# Patient Record
Sex: FEMALE | Race: WHITE | Employment: UNEMPLOYED | URBAN - NONMETROPOLITAN AREA
[De-identification: names, ages, dates, MRNs, and addresses within clinical notes are randomized per-mention and may not be internally consistent; named-entity substitution may affect disease eponyms.]

---

## 2019-07-30 ENCOUNTER — OFFICE VISIT (OUTPATIENT)
Dept: FAMILY MEDICINE CLINIC | Age: 39
End: 2019-07-30
Payer: COMMERCIAL

## 2019-07-30 VITALS
WEIGHT: 160 LBS | OXYGEN SATURATION: 98 % | TEMPERATURE: 97.1 F | HEART RATE: 74 BPM | SYSTOLIC BLOOD PRESSURE: 112 MMHG | DIASTOLIC BLOOD PRESSURE: 70 MMHG

## 2019-07-30 DIAGNOSIS — Z76.89 ENCOUNTER TO ESTABLISH CARE: Primary | ICD-10-CM

## 2019-07-30 DIAGNOSIS — K58.2 IRRITABLE BOWEL SYNDROME WITH BOTH CONSTIPATION AND DIARRHEA: ICD-10-CM

## 2019-07-30 DIAGNOSIS — Z86.19 HISTORY OF VARICELLA INFECTION: Primary | ICD-10-CM

## 2019-07-30 PROCEDURE — G8427 DOCREV CUR MEDS BY ELIG CLIN: HCPCS | Performed by: FAMILY MEDICINE

## 2019-07-30 PROCEDURE — 1036F TOBACCO NON-USER: CPT | Performed by: FAMILY MEDICINE

## 2019-07-30 PROCEDURE — 99203 OFFICE O/P NEW LOW 30 MIN: CPT | Performed by: FAMILY MEDICINE

## 2019-07-30 PROCEDURE — G8421 BMI NOT CALCULATED: HCPCS | Performed by: FAMILY MEDICINE

## 2019-07-30 RX ORDER — BUPROPION HYDROCHLORIDE 300 MG/1
300 TABLET ORAL EVERY MORNING
COMMUNITY
End: 2019-11-08

## 2019-07-30 RX ORDER — CLONAZEPAM 0.5 MG/1
0.5 TABLET ORAL 3 TIMES DAILY PRN
COMMUNITY
End: 2020-10-08 | Stop reason: SDUPTHER

## 2019-07-30 RX ORDER — HYOSCYAMINE SULFATE 0.125 MG
125 TABLET ORAL EVERY 4 HOURS PRN
Qty: 180 TABLET | Refills: 3 | Status: SHIPPED | OUTPATIENT
Start: 2019-07-30 | End: 2021-06-14 | Stop reason: SDUPTHER

## 2019-07-30 ASSESSMENT — ENCOUNTER SYMPTOMS
ALLERGIC/IMMUNOLOGIC NEGATIVE: 1
GASTROINTESTINAL NEGATIVE: 1
EYES NEGATIVE: 1
RESPIRATORY NEGATIVE: 1

## 2019-07-30 ASSESSMENT — PATIENT HEALTH QUESTIONNAIRE - PHQ9
SUM OF ALL RESPONSES TO PHQ QUESTIONS 1-9: 0
2. FEELING DOWN, DEPRESSED OR HOPELESS: 0
SUM OF ALL RESPONSES TO PHQ9 QUESTIONS 1 & 2: 0
SUM OF ALL RESPONSES TO PHQ QUESTIONS 1-9: 0
1. LITTLE INTEREST OR PLEASURE IN DOING THINGS: 0

## 2019-07-30 NOTE — PROGRESS NOTES
and full passive range of motion without pain. Neck supple. Cardiovascular: Normal rate, regular rhythm, S1 normal, S2 normal, normal heart sounds and normal pulses. Pulmonary/Chest: Effort normal and breath sounds normal.   Abdominal: Soft. Normal appearance and bowel sounds are normal.   Musculoskeletal: Normal range of motion. Neurological: She is alert and oriented to person, place, and time. She has normal strength and normal reflexes. Skin: Skin is warm, dry and intact. Psychiatric: She has a normal mood and affect. Her speech is normal and behavior is normal. Judgment and thought content normal. Cognition and memory are normal.      /70 (Site: Right Upper Arm, Position: Sitting, Cuff Size: Medium Adult)   Pulse 74   Temp 97.1 °F (36.2 °C) (Temporal)   Wt 160 lb (72.6 kg)   SpO2 98%      ASSESSMENT:     Diagnosis Orders   1. Encounter to establish care  CBC    Comprehensive Metabolic Panel    Hemoglobin A1C    MICROALBUMIN, RANDOM URINE (W/O CREATININE)    Lipid Panel    Urinalysis   2. Irritable bowel syndrome with both constipation and diarrhea-ongoing hyoscyamine (ANASPAZ;LEVSIN) 125 MCG tablet        PLAN:    1. Records from previous PCP. Blood work. Go to health department for vaccination if needed. 2.  Refill medication.   Follow-up 6 months

## 2019-08-06 DIAGNOSIS — Z86.19 HISTORY OF VARICELLA INFECTION: ICD-10-CM

## 2019-08-06 DIAGNOSIS — Z76.89 ENCOUNTER TO ESTABLISH CARE: ICD-10-CM

## 2019-08-06 LAB
ALBUMIN SERPL-MCNC: 4.1 G/DL (ref 3.5–5.2)
ALP BLD-CCNC: 76 U/L (ref 35–104)
ALT SERPL-CCNC: 16 U/L (ref 5–33)
ANION GAP SERPL CALCULATED.3IONS-SCNC: 11 MMOL/L (ref 7–19)
AST SERPL-CCNC: 16 U/L (ref 5–32)
BILIRUB SERPL-MCNC: 0.4 MG/DL (ref 0.2–1.2)
BILIRUBIN URINE: NEGATIVE
BLOOD, URINE: NEGATIVE
BUN BLDV-MCNC: 11 MG/DL (ref 6–20)
CALCIUM SERPL-MCNC: 9.1 MG/DL (ref 8.6–10)
CHLORIDE BLD-SCNC: 104 MMOL/L (ref 98–111)
CHOLESTEROL, TOTAL: 148 MG/DL (ref 160–199)
CLARITY: CLEAR
CO2: 24 MMOL/L (ref 22–29)
COLOR: YELLOW
CREAT SERPL-MCNC: 0.7 MG/DL (ref 0.5–0.9)
GFR NON-AFRICAN AMERICAN: >60
GLUCOSE BLD-MCNC: 85 MG/DL (ref 74–109)
GLUCOSE URINE: NEGATIVE MG/DL
HBA1C MFR BLD: 5.3 % (ref 4–6)
HCT VFR BLD CALC: 39.8 % (ref 37–47)
HDLC SERPL-MCNC: 51 MG/DL (ref 65–121)
HEMOGLOBIN: 12.5 G/DL (ref 12–16)
KETONES, URINE: NEGATIVE MG/DL
LDL CHOLESTEROL CALCULATED: 82 MG/DL
LEUKOCYTE ESTERASE, URINE: NEGATIVE
MCH RBC QN AUTO: 26.7 PG (ref 27–31)
MCHC RBC AUTO-ENTMCNC: 31.4 G/DL (ref 33–37)
MCV RBC AUTO: 84.9 FL (ref 81–99)
MICROALBUMIN UR-MCNC: <1.2 MG/DL (ref 0–19)
NITRITE, URINE: NEGATIVE
PDW BLD-RTO: 14.3 % (ref 11.5–14.5)
PH UA: 6.5 (ref 5–8)
PLATELET # BLD: 344 K/UL (ref 130–400)
PMV BLD AUTO: 10.4 FL (ref 9.4–12.3)
POTASSIUM SERPL-SCNC: 4.1 MMOL/L (ref 3.5–5)
PROTEIN UA: NEGATIVE MG/DL
RBC # BLD: 4.69 M/UL (ref 4.2–5.4)
SODIUM BLD-SCNC: 139 MMOL/L (ref 136–145)
SPECIFIC GRAVITY UA: 1.01 (ref 1–1.03)
TOTAL PROTEIN: 6.7 G/DL (ref 6.6–8.7)
TRIGL SERPL-MCNC: 74 MG/DL (ref 0–149)
UROBILINOGEN, URINE: 0.2 E.U./DL
WBC # BLD: 7 K/UL (ref 4.8–10.8)

## 2019-08-09 LAB
VARICELLA ZOSTER AB IGM: 0.64 ISR
VZV IGG SER QL IA: 1758 IV

## 2019-11-08 ENCOUNTER — OFFICE VISIT (OUTPATIENT)
Dept: FAMILY MEDICINE CLINIC | Age: 39
End: 2019-11-08
Payer: COMMERCIAL

## 2019-11-08 VITALS
TEMPERATURE: 98 F | DIASTOLIC BLOOD PRESSURE: 80 MMHG | RESPIRATION RATE: 16 BRPM | WEIGHT: 172 LBS | OXYGEN SATURATION: 98 % | SYSTOLIC BLOOD PRESSURE: 124 MMHG | HEART RATE: 70 BPM

## 2019-11-08 DIAGNOSIS — H93.13 TINNITUS OF BOTH EARS: Primary | ICD-10-CM

## 2019-11-08 DIAGNOSIS — L60.0 INGROWN TOENAIL OF LEFT FOOT: ICD-10-CM

## 2019-11-08 PROCEDURE — 99213 OFFICE O/P EST LOW 20 MIN: CPT | Performed by: NURSE PRACTITIONER

## 2019-11-08 PROCEDURE — G8427 DOCREV CUR MEDS BY ELIG CLIN: HCPCS | Performed by: NURSE PRACTITIONER

## 2019-11-08 PROCEDURE — G8484 FLU IMMUNIZE NO ADMIN: HCPCS | Performed by: NURSE PRACTITIONER

## 2019-11-08 PROCEDURE — 1036F TOBACCO NON-USER: CPT | Performed by: NURSE PRACTITIONER

## 2019-11-08 PROCEDURE — G8421 BMI NOT CALCULATED: HCPCS | Performed by: NURSE PRACTITIONER

## 2019-11-08 RX ORDER — AMOXICILLIN AND CLAVULANATE POTASSIUM 875; 125 MG/1; MG/1
1 TABLET, FILM COATED ORAL 2 TIMES DAILY
Qty: 20 TABLET | Refills: 0 | Status: SHIPPED | OUTPATIENT
Start: 2019-11-08 | End: 2019-11-08 | Stop reason: CLARIF

## 2019-11-08 RX ORDER — PREDNISONE 10 MG/1
10 TABLET ORAL 2 TIMES DAILY
Qty: 10 TABLET | Refills: 0 | Status: SHIPPED | OUTPATIENT
Start: 2019-11-08 | End: 2019-11-08 | Stop reason: CLARIF

## 2019-11-08 RX ORDER — SULFAMETHOXAZOLE AND TRIMETHOPRIM 800; 160 MG/1; MG/1
1 TABLET ORAL 2 TIMES DAILY
Qty: 10 TABLET | Refills: 0 | Status: SHIPPED | OUTPATIENT
Start: 2019-11-08 | End: 2019-11-13

## 2020-01-09 ENCOUNTER — OFFICE VISIT (OUTPATIENT)
Dept: FAMILY MEDICINE CLINIC | Age: 40
End: 2020-01-09
Payer: COMMERCIAL

## 2020-01-09 VITALS
DIASTOLIC BLOOD PRESSURE: 70 MMHG | HEART RATE: 78 BPM | TEMPERATURE: 97.7 F | SYSTOLIC BLOOD PRESSURE: 120 MMHG | WEIGHT: 174 LBS | OXYGEN SATURATION: 99 %

## 2020-01-09 DIAGNOSIS — R41.840 POOR CONCENTRATION: ICD-10-CM

## 2020-01-09 LAB
ALBUMIN SERPL-MCNC: 4.3 G/DL (ref 3.5–5.2)
ALP BLD-CCNC: 79 U/L (ref 35–104)
ALT SERPL-CCNC: 12 U/L (ref 5–33)
ANION GAP SERPL CALCULATED.3IONS-SCNC: 13 MMOL/L (ref 7–19)
AST SERPL-CCNC: 15 U/L (ref 5–32)
BILIRUB SERPL-MCNC: 0.3 MG/DL (ref 0.2–1.2)
BUN BLDV-MCNC: 8 MG/DL (ref 6–20)
CALCIUM SERPL-MCNC: 9.1 MG/DL (ref 8.6–10)
CHLORIDE BLD-SCNC: 104 MMOL/L (ref 98–111)
CO2: 23 MMOL/L (ref 22–29)
CREAT SERPL-MCNC: 0.5 MG/DL (ref 0.5–0.9)
FOLATE: 8.5 NG/ML (ref 4.8–37.3)
GFR NON-AFRICAN AMERICAN: >60
GLUCOSE BLD-MCNC: 86 MG/DL (ref 74–109)
HCT VFR BLD CALC: 40.4 % (ref 37–47)
HEMOGLOBIN: 12.6 G/DL (ref 12–16)
MCH RBC QN AUTO: 26.8 PG (ref 27–31)
MCHC RBC AUTO-ENTMCNC: 31.2 G/DL (ref 33–37)
MCV RBC AUTO: 85.8 FL (ref 81–99)
PDW BLD-RTO: 12.9 % (ref 11.5–14.5)
PLATELET # BLD: 372 K/UL (ref 130–400)
PMV BLD AUTO: 10.1 FL (ref 9.4–12.3)
POTASSIUM SERPL-SCNC: 4 MMOL/L (ref 3.5–5)
RBC # BLD: 4.71 M/UL (ref 4.2–5.4)
SODIUM BLD-SCNC: 140 MMOL/L (ref 136–145)
TOTAL PROTEIN: 7 G/DL (ref 6.6–8.7)
TSH SERPL DL<=0.05 MIU/L-ACNC: 1.35 UIU/ML (ref 0.27–4.2)
VITAMIN B-12: 291 PG/ML (ref 211–946)
WBC # BLD: 5.7 K/UL (ref 4.8–10.8)

## 2020-01-09 PROCEDURE — 99213 OFFICE O/P EST LOW 20 MIN: CPT | Performed by: FAMILY MEDICINE

## 2020-01-09 PROCEDURE — 1036F TOBACCO NON-USER: CPT | Performed by: FAMILY MEDICINE

## 2020-01-09 PROCEDURE — G8484 FLU IMMUNIZE NO ADMIN: HCPCS | Performed by: FAMILY MEDICINE

## 2020-01-09 PROCEDURE — G8421 BMI NOT CALCULATED: HCPCS | Performed by: FAMILY MEDICINE

## 2020-01-09 PROCEDURE — G8427 DOCREV CUR MEDS BY ELIG CLIN: HCPCS | Performed by: FAMILY MEDICINE

## 2020-01-09 ASSESSMENT — ENCOUNTER SYMPTOMS
EYES NEGATIVE: 1
ALLERGIC/IMMUNOLOGIC NEGATIVE: 1
RESPIRATORY NEGATIVE: 1
GASTROINTESTINAL NEGATIVE: 1

## 2020-01-09 ASSESSMENT — PATIENT HEALTH QUESTIONNAIRE - PHQ9
SUM OF ALL RESPONSES TO PHQ QUESTIONS 1-9: 0
1. LITTLE INTEREST OR PLEASURE IN DOING THINGS: 0
SUM OF ALL RESPONSES TO PHQ9 QUESTIONS 1 & 2: 0
SUM OF ALL RESPONSES TO PHQ QUESTIONS 1-9: 0
2. FEELING DOWN, DEPRESSED OR HOPELESS: 0

## 2020-01-09 NOTE — PROGRESS NOTES
SUBJECTIVE:    Patient ID: Capri Correa is a 46 y. o.female. HPI:   here for evaluation of poor concentration  Patient is a 81-year-old white female. She denies depression symptoms. Denies anxiety symptoms. According to her she is sleeping properly. But she has been having significant problems with concentration. 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 She have a bad semester. Hard time remembering things. She have a hard time staying on task. She initiate task without finishing them. According to her she was not like that before. Past Medical History:   Diagnosis Date    Anxiety     IBS (irritable bowel syndrome)     Post partum depression       Current Outpatient Medications   Medication Sig Dispense Refill    clonazePAM (KLONOPIN) 0.5 MG tablet Take 0.5 mg by mouth 3 times daily as needed.  hyoscyamine (ANASPAZ;LEVSIN) 125 MCG tablet Take 1 tablet by mouth every 4 hours as needed for Cramping 180 tablet 3     No current facility-administered medications for this visit. Allergies   Allergen Reactions    Betadine [Povidone Iodine]        Review of Systems   Constitutional: Negative. HENT: Negative. Eyes: Negative. Respiratory: Negative. Cardiovascular: Negative. Gastrointestinal: Negative. Endocrine: Negative. Genitourinary: Negative. Musculoskeletal: Negative. Skin: Negative. Allergic/Immunologic: Negative. Neurological: Negative. Hematological: Negative. Psychiatric/Behavioral: Negative. OBJECTIVE:    Physical Exam  Constitutional:       Appearance: Normal appearance. She is well-developed and well-groomed. HENT:      Head: Normocephalic and atraumatic. Right Ear: Tympanic membrane, ear canal and external ear normal. There is no impacted cerumen. Left Ear: Tympanic membrane, ear canal and external ear normal. There is no impacted cerumen.       Nose: Nose normal.      Mouth/Throat: Lips: Pink. Mouth: Mucous membranes are moist.      Dentition: Normal dentition. Pharynx: Oropharynx is clear. Uvula midline. Eyes:      General: Lids are normal.         Right eye: No discharge. Left eye: No discharge. Extraocular Movements: Extraocular movements intact. Conjunctiva/sclera: Conjunctivae normal.      Right eye: Right conjunctiva is not injected. Left eye: Left conjunctiva is not injected. Pupils: Pupils are equal, round, and reactive to light. Neck:      Musculoskeletal: Full passive range of motion without pain, normal range of motion and neck supple. Thyroid: No thyromegaly. Vascular: No carotid bruit or JVD. Cardiovascular:      Rate and Rhythm: Normal rate and regular rhythm. Pulses: Normal pulses. Carotid pulses are 2+ on the right side and 2+ on the left side. Radial pulses are 2+ on the right side and 2+ on the left side. Heart sounds: Normal heart sounds, S1 normal and S2 normal. No murmur. Pulmonary:      Effort: Pulmonary effort is normal.      Breath sounds: Normal breath sounds. Abdominal:      General: Bowel sounds are normal. There is no distension or abdominal bruit. Palpations: Abdomen is soft. There is no mass. Hernia: No hernia is present. Musculoskeletal: Normal range of motion. Right lower leg: No edema. Left lower leg: No edema. Lymphadenopathy:      Cervical: No cervical adenopathy. Right cervical: No superficial cervical adenopathy. Left cervical: No superficial cervical adenopathy. Upper Body:      Right upper body: No supraclavicular adenopathy. Left upper body: No supraclavicular adenopathy. Skin:     General: Skin is warm and dry. Coloration: Skin is not pale. Findings: No lesion or rash. Nails: There is no clubbing. Neurological:      Mental Status: She is alert and oriented to person, place, and time.       Motor: No weakness or tremor. Coordination: Coordination normal.      Deep Tendon Reflexes: Reflexes are normal and symmetric. Psychiatric:         Attention and Perception: Attention normal.         Mood and Affect: Mood normal.         Speech: Speech normal.         Behavior: Behavior normal. Behavior is cooperative. Thought Content: Thought content normal.         Cognition and Memory: Cognition and memory normal.         Judgment: Judgment normal.        /70 (Site: Right Upper Arm, Position: Sitting, Cuff Size: Large Adult)   Pulse 78   Temp 97.7 °F (36.5 °C) (Temporal)   Wt 174 lb (78.9 kg)   SpO2 99%      ASSESSMENT:     Diagnosis Orders   1. Poor concentration-possible ADHD CBC    TSH without Reflex    Comprehensive Metabolic Panel    Vitamin B12    Folate    RPR Reflex to Titer and TPPA   2. Refused influenza vaccine          PLAN:    1. Obtain blood work. Make an appointment to see Kumar Amezquita for evaluation of ADHD.   2.  Influenza vaccine recommended but refused

## 2020-01-10 LAB — RPR: NORMAL

## 2020-01-14 ENCOUNTER — PROCEDURE VISIT (OUTPATIENT)
Dept: OTOLARYNGOLOGY | Facility: CLINIC | Age: 40
End: 2020-01-14

## 2020-01-14 ENCOUNTER — OFFICE VISIT (OUTPATIENT)
Dept: OTOLARYNGOLOGY | Facility: CLINIC | Age: 40
End: 2020-01-14

## 2020-01-14 ENCOUNTER — OFFICE VISIT (OUTPATIENT)
Dept: PSYCHIATRY | Age: 40
End: 2020-01-14
Payer: COMMERCIAL

## 2020-01-14 VITALS
RESPIRATION RATE: 20 BRPM | HEART RATE: 67 BPM | WEIGHT: 173 LBS | BODY MASS INDEX: 33.96 KG/M2 | DIASTOLIC BLOOD PRESSURE: 82 MMHG | TEMPERATURE: 98 F | HEIGHT: 60 IN | SYSTOLIC BLOOD PRESSURE: 138 MMHG

## 2020-01-14 DIAGNOSIS — H93.13 TINNITUS OF BOTH EARS: Primary | ICD-10-CM

## 2020-01-14 DIAGNOSIS — H93.19 TINNITUS, UNSPECIFIED LATERALITY: Primary | ICD-10-CM

## 2020-01-14 DIAGNOSIS — H69.83 DYSFUNCTION OF BOTH EUSTACHIAN TUBES: ICD-10-CM

## 2020-01-14 DIAGNOSIS — M26.609 TMJ (TEMPOROMANDIBULAR JOINT DISORDER): ICD-10-CM

## 2020-01-14 PROCEDURE — 99203 OFFICE O/P NEW LOW 30 MIN: CPT | Performed by: NURSE PRACTITIONER

## 2020-01-14 PROCEDURE — 90791 PSYCH DIAGNOSTIC EVALUATION: CPT | Performed by: SOCIAL WORKER

## 2020-01-14 RX ORDER — FLUTICASONE PROPIONATE 50 MCG
2 SPRAY, SUSPENSION (ML) NASAL DAILY
Qty: 1 BOTTLE | Refills: 6 | Status: SHIPPED | OUTPATIENT
Start: 2020-01-14 | End: 2020-02-13

## 2020-01-14 RX ORDER — SULFAMETHOXAZOLE AND TRIMETHOPRIM 800; 160 MG/1; MG/1
TABLET ORAL
COMMUNITY
Start: 2019-11-08 | End: 2020-09-28

## 2020-01-14 RX ORDER — CLONAZEPAM 0.5 MG/1
0.5 TABLET ORAL
COMMUNITY

## 2020-01-14 RX ORDER — HYOSCYAMINE SULFATE 0.125 MG
125 TABLET ORAL
COMMUNITY
Start: 2019-07-30

## 2020-01-14 RX ORDER — AZELASTINE 1 MG/ML
2 SPRAY, METERED NASAL 2 TIMES DAILY
Qty: 30 ML | Refills: 6 | Status: SHIPPED | OUTPATIENT
Start: 2020-01-14 | End: 2020-02-13

## 2020-01-14 NOTE — PROGRESS NOTES
Behavioral Health Consultation  Rosita Dawn Iowa  1/14/2020  08:30      Time spent with Patient: 30 minutes  [facility]. Reason for Consult:    Chief Complaint   Patient presents with    Stress    Anxiety     Referring Provider: Jocelyn Rodríguez MD  [address], [place] [id number]    Pt provided informed consent for the behavioral health program. Discussed with patient model of service to include the limits of confidentiality (i.e. abuse reporting, suicide intervention, etc.) and short-term intervention focused approach. Pt indicated understanding. Feedback given to PCP. S:    August Semester and finished in December - taking 3 classes. Ended up withdrawing. 1 failing class. The other 2 classes - struggled - not test related. Research and responding to discussion boards. Taking online classes. No anxiety related to not being able to succeed in class. Hx of Anxiety - klonopin which is situational.  Taking it for almost 10 years. Catastrophic thinking     Reactive Depression and Postpartum Depression. Family - , has a total of 3 (2 biological). Was raising 2 nieces but they have moved. Starting back to school next week at [employer]. Not working right now. Forgets a lot   Sometimes loses things - not a lot. Has to go back to check and make sure the door is locked.        O:  MSE:    Appearance    alert, cooperative, smiling  Appetite normal  Sleep disturbance No  Fatigue No  Loss of pleasure No  Impulsive behavior No  Speech    spontaneous, normal rate and normal volume  Mood    Euthymic in presentation   Affect    normal affect  Thought Content    intact  Thought Process    coherent  Associations    logical connections  Insight    Good  Judgment    Intact  Orientation    oriented to person, place, time, and general circumstances  Memory    recent and remote memory intact  Attention/Concentration    impaired  Morbid

## 2020-02-07 ENCOUNTER — OFFICE VISIT (OUTPATIENT)
Dept: PSYCHIATRY | Age: 40
End: 2020-02-07
Payer: COMMERCIAL

## 2020-02-07 PROCEDURE — 90837 PSYTX W PT 60 MINUTES: CPT | Performed by: SOCIAL WORKER

## 2020-02-07 ASSESSMENT — ANXIETY QUESTIONNAIRES
3. WORRYING TOO MUCH ABOUT DIFFERENT THINGS: 1-SEVERAL DAYS
2. NOT BEING ABLE TO STOP OR CONTROL WORRYING: 1-SEVERAL DAYS
1. FEELING NERVOUS, ANXIOUS, OR ON EDGE: 0-NOT AT ALL
GAD7 TOTAL SCORE: 6
6. BECOMING EASILY ANNOYED OR IRRITABLE: 0-NOT AT ALL
5. BEING SO RESTLESS THAT IT IS HARD TO SIT STILL: 0-NOT AT ALL
7. FEELING AFRAID AS IF SOMETHING AWFUL MIGHT HAPPEN: 3-NEARLY EVERY DAY
4. TROUBLE RELAXING: 1-SEVERAL DAYS

## 2020-02-07 ASSESSMENT — PATIENT HEALTH QUESTIONNAIRE - PHQ9
1. LITTLE INTEREST OR PLEASURE IN DOING THINGS: 1
8. MOVING OR SPEAKING SO SLOWLY THAT OTHER PEOPLE COULD HAVE NOTICED. OR THE OPPOSITE, BEING SO FIGETY OR RESTLESS THAT YOU HAVE BEEN MOVING AROUND A LOT MORE THAN USUAL: 0
10. IF YOU CHECKED OFF ANY PROBLEMS, HOW DIFFICULT HAVE THESE PROBLEMS MADE IT FOR YOU TO DO YOUR WORK, TAKE CARE OF THINGS AT HOME, OR GET ALONG WITH OTHER PEOPLE: 3
7. TROUBLE CONCENTRATING ON THINGS, SUCH AS READING THE NEWSPAPER OR WATCHING TELEVISION: 3
SUM OF ALL RESPONSES TO PHQ QUESTIONS 1-9: 12
2. FEELING DOWN, DEPRESSED OR HOPELESS: 3
3. TROUBLE FALLING OR STAYING ASLEEP: 2
9. THOUGHTS THAT YOU WOULD BE BETTER OFF DEAD, OR OF HURTING YOURSELF: 0
SUM OF ALL RESPONSES TO PHQ QUESTIONS 1-9: 12
4. FEELING TIRED OR HAVING LITTLE ENERGY: 1
SUM OF ALL RESPONSES TO PHQ9 QUESTIONS 1 & 2: 4
5. POOR APPETITE OR OVEREATING: 0
6. FEELING BAD ABOUT YOURSELF - OR THAT YOU ARE A FAILURE OR HAVE LET YOURSELF OR YOUR FAMILY DOWN: 2

## 2020-02-07 NOTE — PATIENT INSTRUCTIONS
feel more relaxed. When you have three minutes  1. While sitting down, check your body for tension. 2.  Relax your facial muscles and allow your jaw to fall open. 3.  Let your shoulders drop. 4.  Let your arms fall to your sides. 5.  Allow your hands to loosen so that there are spaces between your fingers. 6.  Uncross your ankles or legs. 7.  Feel your thighs sink into your chair, let your legs fall comfortably apart. 8.  Feel your shins and calves become heavier and your feet rest heavy on the floor. 9.  Now breathe in slowly and out slowly. 10.  Each time you breathe out try to relax even more. Personal Thought  Control. Our thinking often creates anxiety for us. Getting better control of our thinking can go a long way in helping us cope. The following steps can be useful. Let yourself become aware of thoughts you have when you are anxious. What are the words that you are saying to yourself at that moment? Sometimes it takes a little practice before we become aware of our thoughts. Some examples might be:  I know something bad is going to happen, or This is horrible or Thalia Batista is this happening to me!?  Write your thoughts down. Its much easier to work with our thoughts, analyze them, and replace them if they are in black and white.   Ask yourself the following questions about your thoughts:  Is it true? (Is it logically correct? Where is the evidence to support the truth of that thought? Are there alternative ways of thinking that would be more correct?). If a thought is not as true as it could be, replace it with a more realistic and helpful one. The majority of thoughts we have that generate anxiety are not the most realistic appraisals of the situation. So what? (If this is logically correct, what does it mean to me? Is there anything I can do about the situation? Is it in my best interest to get anxious about this?). Use coping self-statements.   When feeling anxious,
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
## 2020-02-10 ENCOUNTER — OFFICE VISIT (OUTPATIENT)
Dept: FAMILY MEDICINE CLINIC | Age: 40
End: 2020-02-10
Payer: COMMERCIAL

## 2020-02-10 VITALS
SYSTOLIC BLOOD PRESSURE: 120 MMHG | OXYGEN SATURATION: 98 % | WEIGHT: 169 LBS | TEMPERATURE: 97.8 F | HEART RATE: 66 BPM | DIASTOLIC BLOOD PRESSURE: 70 MMHG

## 2020-02-10 PROCEDURE — G8421 BMI NOT CALCULATED: HCPCS | Performed by: FAMILY MEDICINE

## 2020-02-10 PROCEDURE — 99213 OFFICE O/P EST LOW 20 MIN: CPT | Performed by: FAMILY MEDICINE

## 2020-02-10 PROCEDURE — 1036F TOBACCO NON-USER: CPT | Performed by: FAMILY MEDICINE

## 2020-02-10 PROCEDURE — G8484 FLU IMMUNIZE NO ADMIN: HCPCS | Performed by: FAMILY MEDICINE

## 2020-02-10 PROCEDURE — G8427 DOCREV CUR MEDS BY ELIG CLIN: HCPCS | Performed by: FAMILY MEDICINE

## 2020-02-10 RX ORDER — BUPROPION HYDROCHLORIDE 300 MG/1
300 TABLET ORAL EVERY MORNING
COMMUNITY
End: 2020-08-17 | Stop reason: SDUPTHER

## 2020-02-10 ASSESSMENT — ENCOUNTER SYMPTOMS
EYES NEGATIVE: 1
GASTROINTESTINAL NEGATIVE: 1
ALLERGIC/IMMUNOLOGIC NEGATIVE: 1
RESPIRATORY NEGATIVE: 1

## 2020-02-15 PROBLEM — M26.609 TMJ (TEMPOROMANDIBULAR JOINT DISORDER): Status: ACTIVE | Noted: 2020-02-15

## 2020-02-15 PROBLEM — H93.13 TINNITUS OF BOTH EARS: Status: ACTIVE | Noted: 2020-02-15

## 2020-02-24 ENCOUNTER — OFFICE VISIT (OUTPATIENT)
Dept: PSYCHIATRY | Age: 40
End: 2020-02-24
Payer: COMMERCIAL

## 2020-02-24 PROCEDURE — 90837 PSYTX W PT 60 MINUTES: CPT | Performed by: SOCIAL WORKER

## 2020-02-24 NOTE — PROGRESS NOTES
Behavioral Health Consultation  Ibrahima MuroDEYSI   02/24/2020  09:30      Time spent with Patient: 60 minutes  This is patient's third  [facility] appointment. Reason for Consult:    Chief Complaint   Patient presents with    Stress    Anxiety     Referring Provider: Mora Roque MD  [facility], [address] [place] [address] S [phone]      Feedback given to PCP. S:  Discussed occurrences since last session. Pt described what sounds like intrusive thoughts, and some OCD type tendencies she is having difficulty managing. Doesn't believe self that she has locked the doors at night time, and will recheck after she has gone to bed. \"I don't do that when my  is home. \"   Described more traumatic events that happened to pt that may have also created what sounds like hypervigilance. Pt described experiencing difficulty focusing on the present. O:  MSE:    Appearance    alert, cooperative  Appetite normal  Sleep disturbance Yes  Fatigue Yes  Loss of pleasure No  Impulsive behavior No  Speech    spontaneous, normal rate and normal volume  Mood    Euthymic   Affect    normal affect  Thought Content    intact and intrusive thoughts  Thought Process    coherent and circumstantial  Associations    logical connections  Insight    Fair  Judgment    Intact  Orientation    oriented to person, place, time, and general circumstances  Memory    recent and remote memory intact  Attention/Concentration    intact  Morbid ideation No  Suicide Assessment    no suicidal ideation      History:    Medications:   Current Outpatient Medications   Medication Sig Dispense Refill    buPROPion (WELLBUTRIN XL) 300 MG extended release tablet Take 300 mg by mouth every morning      clonazePAM (KLONOPIN) 0.5 MG tablet Take 0.5 mg by mouth 3 times daily as needed.        hyoscyamine (ANASPAZ;LEVSIN) 125 MCG tablet Take 1 tablet by mouth every 4 hours as needed for Cramping 180 tablet 3     No current facility-administered medications for this visit. Social History:   Social History     Socioeconomic History    Marital status:      Spouse name: Not on file    Number of children: Not on file    Years of education: Not on file    Highest education level: Not on file   Occupational History    Not on file   Social Needs    Financial resource strain: Not on file    Food insecurity:     Worry: Not on file     Inability: Not on file    Transportation needs:     Medical: Not on file     Non-medical: Not on file   Tobacco Use    Smoking status: Former Smoker     Packs/day: 0.50     Years: 5.00     Pack years: 2.50     Last attempt to quit: 7/30/2009     Years since quitting: 10.5    Smokeless tobacco: Never Used   Substance and Sexual Activity    Alcohol use: Not on file    Drug use: Not on file    Sexual activity: Not on file   Lifestyle    Physical activity:     Days per week: Not on file     Minutes per session: Not on file    Stress: Not on file   Relationships    Social connections:     Talks on phone: Not on file     Gets together: Not on file     Attends Rastafarian service: Not on file     Active member of club or organization: Not on file     Attends meetings of clubs or organizations: Not on file     Relationship status: Not on file    Intimate partner violence:     Fear of current or ex partner: Not on file     Emotionally abused: Not on file     Physically abused: Not on file     Forced sexual activity: Not on file   Other Topics Concern    Not on file   Social History Narrative    Not on file       TOBACCO:   reports that she quit smoking about 10 years ago. She has a 2.50 pack-year smoking history. She has never used smokeless tobacco.  ETOH:   has no history on file for alcohol.     Family History:   Family History   Problem Relation Age of Onset    Diabetes Mother     High Blood Pressure Father     Other Paternal Grandmother         over produced platelets    Colon Cancer Paternal Grandfather     Cancer Paternal Grandfather         bladder         A:  Pt appears to be experiencing what sounds like hypervigilance as well as some OCD type tendencies and intrusive thoughts which go together in some instances. Some of this may be due to a hx of traumatic situation that has happened to her or she observed. [facility] provided pt with psycho education on the effects of trauma and the brain, and provided psycho education today on mindfulness techniques to try help her focus on the present. Recommended pt focus on specific problem areas and provided a mindfulness techniques to help her reduce ocd tendencies to return and recheck locked doors. Discussed the benefits of utilizing guided meditation.      Diagnosis:    Adult Attention Deficit Disorder  Generalized anxiety disorder      Diagnosis Date    Anxiety     IBS (irritable bowel syndrome)     Post partum depression      Educational problems and Other psychosocial and environmental problems      Plan:  Pt interventions:  Supportive techniques, Cognitive strategies to target intrusive thoughts including mindfulness and grounding techniques  and Identified maladaptive thoughts      Pt Behavioral Change Plan:  Provided psycho education on Grounding, and provided grounding techniques as well as some guided meditation shanthi's to try

## 2020-04-17 ENCOUNTER — TELEMEDICINE (OUTPATIENT)
Dept: FAMILY MEDICINE CLINIC | Age: 40
End: 2020-04-17
Payer: COMMERCIAL

## 2020-04-17 PROCEDURE — G8421 BMI NOT CALCULATED: HCPCS | Performed by: FAMILY MEDICINE

## 2020-04-17 PROCEDURE — 1036F TOBACCO NON-USER: CPT | Performed by: FAMILY MEDICINE

## 2020-04-17 PROCEDURE — 99214 OFFICE O/P EST MOD 30 MIN: CPT | Performed by: FAMILY MEDICINE

## 2020-04-17 PROCEDURE — G8428 CUR MEDS NOT DOCUMENT: HCPCS | Performed by: FAMILY MEDICINE

## 2020-04-17 RX ORDER — TRIAMCINOLONE ACETONIDE 1 MG/ML
LOTION TOPICAL
Qty: 1 BOTTLE | Refills: 0 | Status: SHIPPED | OUTPATIENT
Start: 2020-04-17 | End: 2021-05-10 | Stop reason: CLARIF

## 2020-04-17 RX ORDER — METHYLPREDNISOLONE 4 MG/1
TABLET ORAL
Qty: 1 KIT | Refills: 0 | Status: SHIPPED | OUTPATIENT
Start: 2020-04-17 | End: 2020-04-23

## 2020-04-17 ASSESSMENT — ENCOUNTER SYMPTOMS
ALLERGIC/IMMUNOLOGIC NEGATIVE: 1
GASTROINTESTINAL NEGATIVE: 1
EYES NEGATIVE: 1
BACK PAIN: 1
RESPIRATORY NEGATIVE: 1

## 2020-04-17 NOTE — PROGRESS NOTES
2020    TELEHEALTH EVALUATION -- Audio/Visual (During [facility]-70 public health emergency)    HPI:  Here for evaluation of multiple medical problems. Patient is 44-year-old white female. She have depression symptoms. She was started on Wellbutrin. She also have ADHD. She been having a hard time with graduate school. She is going to business school now. She have improvement in symptoms of depression with Wellbutrin. She felt back to the baseline from that standpoint but she still have very poorly concentration. Wellbutrin is not helping ADHD at all. She also have pain in the tailbone. She have this for the couple of weeks now. She said that she have the same problem 2 years ago after a fall. She have no new injuries. Pain started 2 weeks ago without reason. Pain is localized to the side of the tailbone. Denies any bowel changes. She also have pain on the left hand. Pain is localized to the dorsal part of the hand and radiate up the forearm. She used that arm to hold her baby. She also have a skin lesion that is very pruritic in her right leg. Capri Tejeda (:  1980) has requested an audio/video evaluation for the following concern(s):    Evaluation of ADHD. Management of depression. Evaluation of tailbone pain and hand and forearm pain. She also have a pruritic skin lesion    Review of Systems   Constitutional: Negative. HENT: Negative. Eyes: Negative. Respiratory: Negative. Cardiovascular: Negative. Gastrointestinal: Negative. Endocrine: Negative. Genitourinary: Negative. Musculoskeletal: Positive for back pain and myalgias. Skin: Positive for rash. Allergic/Immunologic: Negative. Neurological: Negative. Hematological: Negative. Psychiatric/Behavioral: Positive for decreased concentration. Prior to Visit Medications    Medication Sig Taking?  Authorizing Provider   lisdexamfetamine (VYVANSE) 30 MG capsule Take 1 capsule by mouth every Normal range of motion of neck        [] Abnormal-       Neurological:        [x] No Facial Asymmetry (Cranial nerve 7 motor function) (limited exam to video visit)          [] No gaze palsy        [] Abnormal-         Skin:        [] No significant exanthematous lesions or discoloration noted on facial skin         [] Abnormal-erythematous raised lesion in the right leg. Psychiatric:       [x] Normal Affect [] No Hallucinations        [] Abnormal-     Other pertinent observable physical exam findings-     ASSESSMENT/PLAN:  1. Attention deficit hyperactivity disorder (ADHD), unspecified ADHD type  - lisdexamfetamine (VYVANSE) 30 MG capsule; Take 1 capsule by mouth every morning for 30 days. Dispense: 30 capsule; Refill: 0  -Risks and benefit of medication discussed with patient. Including cardiac side effects, weight loss and insomnia  -[place]  2. Depression, unspecified depression type-improved  -Continue Wellbutrin  3. Tail bone pain-possible SI joint  - methylPREDNISolone (MEDROL DOSEPACK) 4 MG tablet; Take by mouth. Dispense: 1 kit; Refill: 0  -Stretching exercises  4. Tendinopathy-most likely diagnosis  -Avoid using the left hand for repetitive motion. Follow-up if not better    5. Dermatitis-acute  - triamcinolone (KENALOG) 0.1 % lotion; Apply topically 3 times daily to a skin lesion on her leg. Dispense: 1 Bottle; Refill: 0  -Dermatology consult PRN  Return in about 4 weeks (around 5/15/2020), or if symptoms worsen or fail to improve. Capri Murphy is a 46 y.o. female being evaluated by a Virtual Visit (video visit) encounter to address concerns as mentioned above. A caregiver was present when appropriate. Due to this being a TeleHealth encounter (During [facility]-29 public health emergency), evaluation of the following organ systems was limited: Vitals/Constitutional/EENT/Resp/CV/GI//MS/Neuro/Skin/Heme-Lymph-Imm.   Pursuant to the emergency declaration under the [address]

## 2020-07-15 ENCOUNTER — TELEPHONE (OUTPATIENT)
Dept: ADMINISTRATIVE | Age: 40
End: 2020-07-15

## 2020-07-15 NOTE — TELEPHONE ENCOUNTER
April requests that someone return their call regarding if she need to come into the office for her appointment or if she need to schedule vv. The best time to reach her is Anytime. Thank you.

## 2020-07-16 ENCOUNTER — TELEMEDICINE (OUTPATIENT)
Dept: FAMILY MEDICINE CLINIC | Age: 40
End: 2020-07-16
Payer: COMMERCIAL

## 2020-07-16 PROCEDURE — 99213 OFFICE O/P EST LOW 20 MIN: CPT | Performed by: FAMILY MEDICINE

## 2020-07-16 PROCEDURE — G8428 CUR MEDS NOT DOCUMENT: HCPCS | Performed by: FAMILY MEDICINE

## 2020-07-16 PROCEDURE — G8421 BMI NOT CALCULATED: HCPCS | Performed by: FAMILY MEDICINE

## 2020-07-16 PROCEDURE — 1036F TOBACCO NON-USER: CPT | Performed by: FAMILY MEDICINE

## 2020-07-16 ASSESSMENT — ENCOUNTER SYMPTOMS
EYES NEGATIVE: 1
GASTROINTESTINAL NEGATIVE: 1
RESPIRATORY NEGATIVE: 1
ALLERGIC/IMMUNOLOGIC NEGATIVE: 1

## 2020-07-16 NOTE — PROGRESS NOTES
2020    TELEHEALTH EVALUATION -- Audio/Visual (During JWDCW-44 public health emergency)    HPI: Patient here for follow-up on ADHD. She is taking Vyvanse for this problem. Medication have been very effective according to her. Denies medication side effect. Request a refill of medication. Capri Tejeda (:  1980) has requested an audio/video evaluation for the following concern(s):    Follow-up on ADHD    Review of Systems   Constitutional: Negative. HENT: Negative. Eyes: Negative. Respiratory: Negative. Cardiovascular: Negative. Gastrointestinal: Negative. Endocrine: Negative. Genitourinary: Negative. Musculoskeletal: Negative. Skin: Negative. Allergic/Immunologic: Negative. Neurological: Negative. Hematological: Negative. Psychiatric/Behavioral: Negative. Prior to Visit Medications    Medication Sig Taking? Authorizing Provider   lisdexamfetamine (VYVANSE) 30 MG capsule Take 1 capsule by mouth every morning for 30 days. Earliest fill date 20 Yes Oneal Cm MD   triamcinolone (KENALOG) 0.1 % lotion Apply topically 3 times daily to a skin lesion on her leg. Oneal Cm MD   buPROPion (WELLBUTRIN XL) 300 MG extended release tablet Take 300 mg by mouth every morning  Historical Provider, MD   clonazePAM (KLONOPIN) 0.5 MG tablet Take 0.5 mg by mouth 3 times daily as needed.    Historical Provider, MD   hyoscyamine (ANASPAZ;LEVSIN) 125 MCG tablet Take 1 tablet by mouth every 4 hours as needed for Piero Lyn MD       Social History     Tobacco Use    Smoking status: Former Smoker     Packs/day: 0.50     Years: 5.00     Pack years: 2.50     Last attempt to quit: 2009     Years since quitting: 10.9    Smokeless tobacco: Never Used   Substance Use Topics    Alcohol use: Not on file    Drug use: Not on file            PHYSICAL EXAMINATION:  [ INSTRUCTIONS:  \"[x]\" Indicates a positive item  \"[]\" Indicates a negative item  -- DELETE ALL ITEMS NOT EXAMINED]  Vital Signs: (As obtained by patient/caregiver or practitioner observation)    Blood pressure-  Heart rate-    Respiratory rate-    Temperature-  Pulse oximetry-   Unable to obtain vital signs  Constitutional: [x] Appears well-developed and well-nourished [] No apparent distress      [] Abnormal-   Mental status  [x] Alert and awake  [] Oriented to person/place/time []Able to follow commands      Eyes:  EOM    [x]  Normal  [] Abnormal-  Sclera  [x]  Normal  [] Abnormal -         Discharge []  None visible  [] Abnormal -    HENT:   [x] Normocephalic, atraumatic. [] Abnormal   [x] Mouth/Throat: Mucous membranes are moist.     External Ears [x] Normal  [] Abnormal-     Neck: [x] No visualized mass     Pulmonary/Chest: [x] Respiratory effort normal.  [] No visualized signs of difficulty breathing or respiratory distress        [] Abnormal-      Musculoskeletal:   [x] Normal gait with no signs of ataxia         [] Normal range of motion of neck        [] Abnormal-       Neurological:        [x] No Facial Asymmetry (Cranial nerve 7 motor function) (limited exam to video visit)          [] No gaze palsy        [] Abnormal-         Skin:        [x] No significant exanthematous lesions or discoloration noted on facial skin         [] Abnormal-            Psychiatric:       [x] Normal Affect [] No Hallucinations        [] Abnormal-     Other pertinent observable physical exam findings-     ASSESSMENT/PLAN:  1. Attention deficit hyperactivity disorder (ADHD), unspecified ADHD type-well-controlled   - lisdexamfetamine (VYVANSE) 30 MG capsule; Take 1 capsule by mouth every morning for 30 days. Earliest fill date 6/13/20  Dispense: 30 capsule; Refill: 0      Return in about 3 months (around 10/16/2020). Capri De is a 46 y.o. female being evaluated by a Virtual Visit (video visit) encounter to address concerns as mentioned above. A caregiver was present when appropriate.  Due to this being a TeleHealth encounter (During MADW-02 public health emergency), evaluation of the following organ systems was limited: Vitals/Constitutional/EENT/Resp/CV/GI//MS/Neuro/Skin/Heme-Lymph-Imm. Pursuant to the emergency declaration under the [address] [place] [address], [address] [place] [address] and the [employer] and [employer] Act, this Virtual Visit was conducted with patient's (and/or legal guardian's) consent, to reduce the patient's risk of exposure to COVID-19 and provide necessary medical care. The patient (and/or legal guardian) has also been advised to contact this office for worsening conditions or problems, and seek emergency medical treatment and/or call 911 if deemed necessary. Patient identification was verified at the start of the visit: Yes    Total time spent on this encounter: 15    Services were provided through a video synchronous discussion virtually to substitute for in-person clinic visit. Patient and provider were located at their individual homes. --Blessing Sosa MD on 7/16/2020 at 11:40 AM    An electronic signature was used to authenticate this note.

## 2020-08-17 ENCOUNTER — PATIENT MESSAGE (OUTPATIENT)
Dept: FAMILY MEDICINE CLINIC | Age: 40
End: 2020-08-17

## 2020-08-17 RX ORDER — BUPROPION HYDROCHLORIDE 300 MG/1
300 TABLET ORAL EVERY MORNING
Qty: 30 TABLET | Refills: 5 | Status: SHIPPED | OUTPATIENT
Start: 2020-08-17 | End: 2021-09-17 | Stop reason: CLARIF

## 2020-09-22 ENCOUNTER — HOSPITAL ENCOUNTER (OUTPATIENT)
Dept: GENERAL RADIOLOGY | Age: 40
Discharge: HOME | End: 2020-09-22
Payer: COMMERCIAL

## 2020-09-22 ENCOUNTER — OFFICE VISIT (OUTPATIENT)
Dept: FAMILY MEDICINE CLINIC | Age: 40
End: 2020-09-22
Payer: COMMERCIAL

## 2020-09-22 VITALS
DIASTOLIC BLOOD PRESSURE: 82 MMHG | WEIGHT: 169 LBS | HEART RATE: 102 BPM | OXYGEN SATURATION: 99 % | SYSTOLIC BLOOD PRESSURE: 120 MMHG | TEMPERATURE: 98.1 F

## 2020-09-22 PROCEDURE — 99213 OFFICE O/P EST LOW 20 MIN: CPT | Performed by: FAMILY MEDICINE

## 2020-09-22 PROCEDURE — 1036F TOBACCO NON-USER: CPT | Performed by: FAMILY MEDICINE

## 2020-09-22 PROCEDURE — 73630 X-RAY EXAM OF FOOT: CPT

## 2020-09-22 PROCEDURE — 73610 X-RAY EXAM OF ANKLE: CPT

## 2020-09-22 PROCEDURE — G8427 DOCREV CUR MEDS BY ELIG CLIN: HCPCS | Performed by: FAMILY MEDICINE

## 2020-09-22 PROCEDURE — G8421 BMI NOT CALCULATED: HCPCS | Performed by: FAMILY MEDICINE

## 2020-09-22 NOTE — PROGRESS NOTES
SUBJECTIVE:    Patient ID: April Shea Shell is a 46 y. o.female. HPI:   Patient here for evaluation of fall. Patient is a 52-year-old white female. She fall in the hyper inverted ankle. She had immediate pain and feel a pop in the fifth metatarsal area. She has not been able to bear weight. She well with crutches. She is here for evaluation. Past Medical History:   Diagnosis Date    Anxiety     IBS (irritable bowel syndrome)     Post partum depression       Current Outpatient Medications   Medication Sig Dispense Refill    Walking Boot MISC by Does not apply route 1 each 0    buPROPion (WELLBUTRIN XL) 300 MG extended release tablet Take 1 tablet by mouth every morning 30 tablet 5    triamcinolone (KENALOG) 0.1 % lotion Apply topically 3 times daily to a skin lesion on her leg. 1 Bottle 0    clonazePAM (KLONOPIN) 0.5 MG tablet Take 0.5 mg by mouth 3 times daily as needed.  hyoscyamine (ANASPAZ;LEVSIN) 125 MCG tablet Take 1 tablet by mouth every 4 hours as needed for Cramping 180 tablet 3    lisdexamfetamine (VYVANSE) 30 MG capsule Take 1 capsule by mouth every morning for 30 days. 30 capsule 0     No current facility-administered medications for this visit. Allergies   Allergen Reactions    Betadine [Povidone Iodine]        Review of Systems   Constitutional: Negative. HENT: Negative. Eyes: Negative. Respiratory: Negative. Cardiovascular: Negative. Gastrointestinal: Negative. Endocrine: Negative. Genitourinary: Negative. Musculoskeletal: Positive for arthralgias. Skin: Negative. Allergic/Immunologic: Negative. Neurological: Negative. Hematological: Negative. Psychiatric/Behavioral: Negative. OBJECTIVE:    Physical Exam  Constitutional:       Appearance: Normal appearance. She is well-developed and well-groomed. HENT:      Head: Normocephalic and atraumatic.       Right Ear: Tympanic membrane, ear canal and external ear normal. There is no Findings: No lesion or rash. Nails: There is no clubbing. Neurological:      Mental Status: She is alert and oriented to person, place, and time. Motor: No weakness or tremor. Coordination: Coordination normal.      Deep Tendon Reflexes: Reflexes are normal and symmetric. Psychiatric:         Attention and Perception: Attention normal.         Mood and Affect: Mood normal.         Speech: Speech normal.         Behavior: Behavior normal. Behavior is cooperative. Thought Content: Thought content normal.         Cognition and Memory: Cognition and memory normal.         Judgment: Judgment normal.        /82   Pulse 102   Temp 98.1 °F (36.7 °C) (Temporal)   Wt 169 lb (76.7 kg)   SpO2 99%      ASSESSMENT:     Diagnosis Orders   1. Fall, initial encounter-sprain versus fracture XR FOOT RIGHT (MIN 3 VIEWS)    XR ANKLE RIGHT (2 VIEWS)    Walking Boot MISC        PLAN:    1. Imaging studies. Walking boot. Ortho consultation.

## 2020-09-28 ENCOUNTER — OFFICE VISIT (OUTPATIENT)
Dept: OTOLARYNGOLOGY | Facility: CLINIC | Age: 40
End: 2020-09-28

## 2020-09-28 VITALS
SYSTOLIC BLOOD PRESSURE: 128 MMHG | WEIGHT: 170 LBS | BODY MASS INDEX: 33.38 KG/M2 | HEART RATE: 89 BPM | DIASTOLIC BLOOD PRESSURE: 84 MMHG | HEIGHT: 60 IN | TEMPERATURE: 97 F

## 2020-09-28 DIAGNOSIS — H92.03 OTALGIA OF BOTH EARS: Primary | ICD-10-CM

## 2020-09-28 DIAGNOSIS — H93.13 TINNITUS OF BOTH EARS: ICD-10-CM

## 2020-09-28 PROCEDURE — 99213 OFFICE O/P EST LOW 20 MIN: CPT | Performed by: OTOLARYNGOLOGY

## 2020-09-28 RX ORDER — LISDEXAMFETAMINE DIMESYLATE 30 MG/1
CAPSULE ORAL
COMMUNITY
Start: 2020-08-17

## 2020-09-28 RX ORDER — BUPROPION HYDROCHLORIDE 300 MG/1
300 TABLET ORAL
COMMUNITY
Start: 2020-08-17

## 2020-09-28 NOTE — PROGRESS NOTES
Hubert Rose MD     Chief Complaint   Patient presents with   • Tinnitus        HPI  Yvette Bowers is a  39 y.o. female who is here for follow up.  She was last seen by the midlevel practitioner on January 14, 2020 for bilateral tinnitus, TMJ arthralgia and potential eustachian tube dysfunction.  She was placed on nasal sprays.  She had normal audiogram with normal type a tympanograms.  She continues to have bilateral tinnitus.  He has not had hearing loss.  Nasal sprays did not help.        Review of Systems   Constitutional: Negative for chills and fever.   HENT: Positive for sore throat and tinnitus.    Gastrointestinal: Negative for diarrhea, nausea and vomiting.      I have reviewed the ROS as documented by the MA/LPN/RN Hubert Rose MD     Past History:   Past medical and surgical history, family history and social history reviewed and updated when appropriate.  Current medications and allergies reviewed and updated when appropriate.  Allergies:  Povidone iodine        Vital Signs:   Temp:  [97 °F (36.1 °C)] 97 °F (36.1 °C)  Heart Rate:  [89] 89  BP: (128)/(84) 128/84    Physical Exam  CONSTITUTIONAL: well nourished, well-developed, alert, oriented, in no acute distress   COMMUNICATION AND VOICE: able to communicate normally, normal voice quality  HEAD: normocephalic, no lesions, atraumatic, no tenderness, no masses   FACE: appearance normal, no lesions, no tenderness, no deformities, facial motion symmetric  EYES: ocular motility normal, eyelids normal, orbits normal, no proptosis, conjunctiva normal , pupils equal, round  HEARING: response to conversational voice normal bilaterally   EXTERNAL EARS: auricles without lesions  EXTERNAL EAR CANALS: normal ear canals without stenosis or significant cerumen  TYMPANIC MEMBRANES: tympanic membrane appearance normal, no lesions, no perforation, normal mobility, no fluid  EXTERNAL NOSE: structure normal, no tenderness on palpation, no nasal discharge,  no lesions, no evidence of trauma, nostrils patent  LIPS: structure normal, no tenderness on palpation, no lesions, no evidence of trauma  NECK: neck appearance normal  LYMPH NODES: no lymphadenopathy  CHEST/RESPIRATORY: respiratory effort normal  CARDIOVASCULAR: extremities without cyanosis or edema, no overt jugulovenous distension present  NEUROLOGIC/PSYCHIATRIC: oriented appropriately for age, mood normal, affect appropriate, cranial nerves intact grossly unless specifically mentioned above     RESULTS REVIEW:    I have reviewed the patients old records in the chart.  The following results were reviewed:   Audiologic testing (01/14/2020 13:00)         Assessment/Plan    Assessment:   1. Otalgia of both ears    2. Tinnitus of both ears        Plan:                   Return if symptoms worsen or fail to improve.        Hubert Rose MD  09/28/20  18:42 CDT

## 2020-09-28 NOTE — PATIENT INSTRUCTIONS
CONTACT INFORMATION:  The main office phone number is [phone]. For emergencies after hours and on weekends, this number will convert over to our answering service and the on call provider will answer. Please try to keep non emergent phone calls/ questions to office hours 9am-5pm Monday through Friday.     [employer]  As an alternative, you can sign up and use the Epic MyChart system for more direct and quicker access for non emergent questions/ problems.  [facility] [employer] allows you to send messages to your doctor, view your test results, renew your prescriptions, schedule appointments, and more. To sign up, go to [employer] and click on the Sign Up Now link in the New User? box. Enter your [employer] Activation Code exactly as it appears below along with the last four digits of your Social Security Number and your Date of Birth () to complete the sign-up process. If you do not sign up before the expiration date, you must request a new code.    [employer] Activation Code: [email or web address]  Expires: 10/28/2020  1:18 PM    If you have questions, you can email [email or web address] or call [phone] to talk to our [employer] staff. Remember, [employer] is NOT to be used for urgent needs. For medical emergencies, dial 911.    LOW SODIUM DIET:  Restrict total daily sodium intake to  2000 mg of sodium a day  Use distilled water to drink and cook if needed  Please look at labelling to determine the sodium content of foods  Use the internet to determine sodium content od unlabelled foods, especially the more common foods you consume  Avoid processed foods  Eat fresh vegetables and meats  (see [employer]'s instruction sheet)      TINNITUS MASKERS  (White Noise Signal Generators)    What is Tinnitus?  Tinnitus is a condition where noises in the ear or the head may be heard but which have no external cause.  The intensity and type of noise varies from each sufferer, but the most common reports are  of ringing noises and high-pitched pure tones.  These noises may be heard all day or for short spells throughout the day.  This may cause lack of concentration and sleep and in some cases may cause severe discomfort.    What is a Tinnitus Masker?  A tinnitus masker is an electronic device which generates a band of noise of sufficient frequency and intensity to “Mask” the tinnitus so as to distract the sufferers mind from the noises, allowing them to carry on with their normal routines.    Facts about Tinnitus  The word “tinnitus” derives from the Latin works “tinnire” and means to ring or tinkle.  The noise heard by suffers is often said to sound like ringing, roaring, hissing or clicking in the ear.  This noise is heard only by the person suffering from the condition and has no external cause.  Almost everybody may experience noises in the ears or head if quiet ambient conditions prevail, but these are usually of low intensity and short duration.  When the intensity and duration increase to the point where the noises are heard during normal everyday conditions, the sufferer may experience discomfort and stress.  This is known as Tinnitus.    What causes Tinnitus?  Scientific and medical knowledge about tinnitus is limited, but research has shown the tinnitus may be caused by exposure to loud noises, disease or a hearing loss. But the majority of cases appear to involve the inner ear, the neural pathways between the ear and the brain itself.  Diet can also be a contributing factor, as some foods are generally identified as causing or aggravating tinnitus.     Alcohol excess alcohol can induce tinnitus in almost everyone.   Salt  Reducing salt intake may help reduce body fluids and help tinnitus.   Caffeine May stimulate the nervous system  Cheese (and other dairy products) are the most commonly identified foods causing or aggravating tinnitus.    Cure or Remedy  A cure for tinnitus has not presently been identified  as there is insufficient knowledge on the subject.  However, it has been established that a successful method of relieving the symptoms of tinnitus is to mask the noises using a device know as a Tinnitus Masker.  These devices generate electronically a band of noise of sufficient frequency and intensity to “block out” or mask the noises within the ear/head.  For users of hearing aids, the hearing aid on its own may provide sufficient sound to relieve the tinnitus.                               Warning  Tinnitus maskers should not be worn too loud or for long periods continuously as this can damage your hearing.    The Puretone Range of Tinnitus Maskers    Custom Made Devices  These Tinnitus maskers are manufactured from an impression of the ear so it fits snugly, matching your individual ear contours.  They may be made in a variety of sizes to fit in the ear or in the ear canal.  Large vents are generally fitted so as not to occlude the ear.  Each masker will have a battery compartment and a volume control (with ON/OFF switch) allowing you to select a comfortable but effective level of loudness.    Behind the Ear Devices  A range of tinnitus maskers incorporated into hearing aid cases which fit neatly behind the ear.  Three models are available of different sizes each of which can be fitted with one of three frequency weighted circuits.  Each masker will be fitted with a battery compartment, an ON/Off switch and a used operated tone switch.    Combination Devices  Either of the above devices may be made as a combination hearing aid/tinnitus masker.  This allows it to be used as a hearing aid for normal use and then the masker noise may be mixed with the hearing aid when required.  These are designed for hearing impaired people who suffer from tinnitus.    Standard Canal Devices  Two models make up this range of generic tinnitus maskers which are small enough to fit within the ear canal.  The first model is fitted  with a bullet shaped shell and the second is fitted with a contoured shell.  This allows for greater flexibility within a small generic device.  Each model is fitted with a battery compartment and a volume control which incorporates the ON/Off switch.    Desktop/Pillow Devices  These devices can be placed on the desk at home or in the office and used at a distance away from the user.  The pillow masker is fitted with a timer which may be set so that it will switch off the device when the user is asleep.  A slimline external transducer is placed under the pillow, allowing the masking noise to filter through the pillow so as to aid sleep.

## 2020-10-06 ENCOUNTER — PATIENT MESSAGE (OUTPATIENT)
Dept: FAMILY MEDICINE CLINIC | Age: 40
End: 2020-10-06

## 2020-10-08 NOTE — TELEPHONE ENCOUNTER
From: April Strait  To: Ninfa Harrison MD  Sent: 10/6/2020 5:58 PM CDT  Subject: Prescription Question    Can Dr. Kishan North send a prescription to [facility] in [place] for my Klonopin 0.5mg or do I need to come in for an appointment?

## 2020-10-11 RX ORDER — CLONAZEPAM 0.5 MG/1
0.5 TABLET ORAL 3 TIMES DAILY PRN
Qty: 60 TABLET | Refills: 0 | Status: SHIPPED | OUTPATIENT
Start: 2020-10-11 | End: 2021-03-12 | Stop reason: SDUPTHER

## 2021-01-06 ENCOUNTER — PATIENT MESSAGE (OUTPATIENT)
Dept: FAMILY MEDICINE CLINIC | Age: 41
End: 2021-01-06

## 2021-01-06 ENCOUNTER — OFFICE VISIT (OUTPATIENT)
Dept: FAMILY MEDICINE CLINIC | Age: 41
End: 2021-01-06
Payer: COMMERCIAL

## 2021-01-06 VITALS
SYSTOLIC BLOOD PRESSURE: 114 MMHG | DIASTOLIC BLOOD PRESSURE: 76 MMHG | WEIGHT: 178.2 LBS | BODY MASS INDEX: 34.99 KG/M2 | OXYGEN SATURATION: 99 % | TEMPERATURE: 96.7 F | HEIGHT: 60 IN | HEART RATE: 74 BPM

## 2021-01-06 DIAGNOSIS — F90.9 ATTENTION DEFICIT HYPERACTIVITY DISORDER (ADHD), UNSPECIFIED ADHD TYPE: Primary | ICD-10-CM

## 2021-01-06 DIAGNOSIS — R06.81 WITNESSED APNEIC SPELLS: ICD-10-CM

## 2021-01-06 DIAGNOSIS — E66.09 OBESITY DUE TO EXCESS CALORIES WITHOUT SERIOUS COMORBIDITY, UNSPECIFIED CLASSIFICATION: ICD-10-CM

## 2021-01-06 PROCEDURE — 1036F TOBACCO NON-USER: CPT | Performed by: FAMILY MEDICINE

## 2021-01-06 PROCEDURE — G8419 CALC BMI OUT NRM PARAM NOF/U: HCPCS | Performed by: FAMILY MEDICINE

## 2021-01-06 PROCEDURE — G8427 DOCREV CUR MEDS BY ELIG CLIN: HCPCS | Performed by: FAMILY MEDICINE

## 2021-01-06 PROCEDURE — G8484 FLU IMMUNIZE NO ADMIN: HCPCS | Performed by: FAMILY MEDICINE

## 2021-01-06 PROCEDURE — 99214 OFFICE O/P EST MOD 30 MIN: CPT | Performed by: FAMILY MEDICINE

## 2021-01-06 RX ORDER — LIRAGLUTIDE 6 MG/ML
0.6 INJECTION, SOLUTION SUBCUTANEOUS DAILY
Qty: 1 PEN | Refills: 5 | Status: SHIPPED | OUTPATIENT
Start: 2021-01-06 | End: 2021-01-07 | Stop reason: CLARIF

## 2021-01-06 ASSESSMENT — PATIENT HEALTH QUESTIONNAIRE - PHQ9
2. FEELING DOWN, DEPRESSED OR HOPELESS: 0
1. LITTLE INTEREST OR PLEASURE IN DOING THINGS: 0
SUM OF ALL RESPONSES TO PHQ QUESTIONS 1-9: 0
SUM OF ALL RESPONSES TO PHQ9 QUESTIONS 1 & 2: 0

## 2021-01-06 ASSESSMENT — ENCOUNTER SYMPTOMS
EYES NEGATIVE: 1
RESPIRATORY NEGATIVE: 1
ALLERGIC/IMMUNOLOGIC NEGATIVE: 1
GASTROINTESTINAL NEGATIVE: 1

## 2021-01-06 NOTE — PROGRESS NOTES
SUBJECTIVE:    Patient ID: Capri Yo is a 45 y. o.female. HPI:   Patient here for follow-up of multiple medical problems  Patient is a 60-year-old female. She have ADHD. She takes Vyvanse with good results. She denies medication side effect. She is compliant with medication. Medication helpful. She works as a nurse. She also is obese. She has history of glucose intolerance. Strong family history of diabetes. At some point she tried a GLP-1 for weight loss with good results. She would like to try that again. Her  and family members have mentioned that she quit breathing when she is sleep. She wake up tired. She would like to be tested for sleep apnea. Past Medical History:   Diagnosis Date    Anxiety     IBS (irritable bowel syndrome)     Post partum depression       Current Outpatient Medications   Medication Sig Dispense Refill    lisdexamfetamine (VYVANSE) 30 MG capsule Take 1 capsule by mouth every morning for 30 days. 30 capsule 0    liraglutide-weight management (SAXENDA) 18 MG/3ML SOPN Inject 0.6 mg into the skin daily 1 pen 5    buPROPion (WELLBUTRIN XL) 300 MG extended release tablet Take 1 tablet by mouth every morning 30 tablet 5    triamcinolone (KENALOG) 0.1 % lotion Apply topically 3 times daily to a skin lesion on her leg. 1 Bottle 0    hyoscyamine (ANASPAZ;LEVSIN) 125 MCG tablet Take 1 tablet by mouth every 4 hours as needed for Cramping 180 tablet 3    clonazePAM (KLONOPIN) 0.5 MG tablet Take 1 tablet by mouth 3 times daily as needed for Anxiety for up to 30 days. 60 tablet 0    Walking Boot MISC by Does not apply route 1 each 0     No current facility-administered medications for this visit. Allergies   Allergen Reactions    Betadine [Povidone Iodine]        Review of Systems   Constitutional: Negative. HENT: Negative. Eyes: Negative. Respiratory: Negative. Cardiovascular: Negative. Gastrointestinal: Negative. Endocrine: Negative. motion. Right lower leg: No edema. Left lower leg: No edema. Lymphadenopathy:      Cervical: No cervical adenopathy. Right cervical: No superficial cervical adenopathy. Left cervical: No superficial cervical adenopathy. Upper Body:      Right upper body: No supraclavicular adenopathy. Left upper body: No supraclavicular adenopathy. Skin:     General: Skin is warm and dry. Coloration: Skin is not pale. Findings: No lesion or rash. Nails: There is no clubbing. Neurological:      Mental Status: She is alert and oriented to person, place, and time. Motor: No weakness or tremor. Coordination: Coordination normal.      Deep Tendon Reflexes: Reflexes are normal and symmetric. Psychiatric:         Attention and Perception: Attention normal.         Mood and Affect: Mood normal.         Speech: Speech normal.         Behavior: Behavior normal. Behavior is cooperative. Thought Content: Thought content normal.         Cognition and Memory: Cognition and memory normal.         Judgment: Judgment normal.        /76 (Site: Left Upper Arm, Position: Sitting, Cuff Size: Medium Adult)   Pulse 74   Temp 96.7 °F (35.9 °C) (Temporal)   Ht 5' (1.524 m)   Wt 178 lb 3.2 oz (80.8 kg)   SpO2 99%   BMI 34.80 kg/m²      ASSESSMENT:     Diagnosis Orders   1. Attention deficit hyperactivity disorder (ADHD), unspecified ADHD typewell-controlled lisdexamfetamine (VYVANSE) 30 MG capsule   2. Obesity due to excess calories without serious comorbidity, unspecified classificationno control CBC    Comprehensive Metabolic Panel    Hemoglobin A1C    Lipid Panel    Urinalysis    TSH without Reflex    liraglutide-weight management (SAXENDA) 18 MG/3ML SOPN   3. Witnessed apneic spellssuspicious for sleep apnea [facility], [place]        PLAN:    1.  Nathalie Wei and continue medication  2. Trial of Saxenda. Blood work. 3.  Refer to sleep study.   She will prefer at-home sleep study  Follow-up 4 weeks

## 2021-01-07 ENCOUNTER — TELEPHONE (OUTPATIENT)
Dept: NEUROLOGY | Age: 41
End: 2021-01-07

## 2021-01-07 DIAGNOSIS — E66.09 OBESITY DUE TO EXCESS CALORIES WITHOUT SERIOUS COMORBIDITY, UNSPECIFIED CLASSIFICATION: ICD-10-CM

## 2021-01-07 LAB
ALBUMIN SERPL-MCNC: 4.3 G/DL (ref 3.5–5.2)
ALP BLD-CCNC: 75 U/L (ref 35–104)
ALT SERPL-CCNC: 11 U/L (ref 5–33)
ANION GAP SERPL CALCULATED.3IONS-SCNC: 11 MMOL/L (ref 7–19)
AST SERPL-CCNC: 14 U/L (ref 5–32)
BACTERIA: ABNORMAL /HPF
BILIRUB SERPL-MCNC: 0.6 MG/DL (ref 0.2–1.2)
BILIRUBIN URINE: NEGATIVE
BLOOD, URINE: ABNORMAL
BUN BLDV-MCNC: 8 MG/DL (ref 6–20)
CALCIUM SERPL-MCNC: 9 MG/DL (ref 8.6–10)
CHLORIDE BLD-SCNC: 107 MMOL/L (ref 98–111)
CHOLESTEROL, TOTAL: 170 MG/DL (ref 160–199)
CLARITY: CLEAR
CO2: 22 MMOL/L (ref 22–29)
COLOR: YELLOW
CREAT SERPL-MCNC: 0.6 MG/DL (ref 0.5–0.9)
CRYSTALS, UA: ABNORMAL /HPF
EPITHELIAL CELLS, UA: 13 /HPF (ref 0–5)
GFR AFRICAN AMERICAN: >59
GFR NON-AFRICAN AMERICAN: >60
GLUCOSE BLD-MCNC: 91 MG/DL (ref 74–109)
GLUCOSE URINE: NEGATIVE MG/DL
HBA1C MFR BLD: 5.2 % (ref 4–6)
HCT VFR BLD CALC: 39.8 % (ref 37–47)
HDLC SERPL-MCNC: 56 MG/DL (ref 65–121)
HEMOGLOBIN: 12.5 G/DL (ref 12–16)
HYALINE CASTS: 2 /HPF (ref 0–8)
KETONES, URINE: NEGATIVE MG/DL
LDL CHOLESTEROL CALCULATED: 98 MG/DL
LEUKOCYTE ESTERASE, URINE: NEGATIVE
MCH RBC QN AUTO: 26.3 PG (ref 27–31)
MCHC RBC AUTO-ENTMCNC: 31.4 G/DL (ref 33–37)
MCV RBC AUTO: 83.8 FL (ref 81–99)
NITRITE, URINE: NEGATIVE
PDW BLD-RTO: 13.1 % (ref 11.5–14.5)
PH UA: 5.5 (ref 5–8)
PLATELET # BLD: 369 K/UL (ref 130–400)
PMV BLD AUTO: 9.6 FL (ref 9.4–12.3)
POTASSIUM SERPL-SCNC: 4 MMOL/L (ref 3.5–5)
PROTEIN UA: NEGATIVE MG/DL
RBC # BLD: 4.75 M/UL (ref 4.2–5.4)
RBC UA: 6 /HPF (ref 0–4)
SODIUM BLD-SCNC: 140 MMOL/L (ref 136–145)
SPECIFIC GRAVITY UA: 1.02 (ref 1–1.03)
TOTAL PROTEIN: 6.7 G/DL (ref 6.6–8.7)
TRIGL SERPL-MCNC: 82 MG/DL (ref 0–149)
TSH SERPL DL<=0.05 MIU/L-ACNC: 1.93 UIU/ML (ref 0.27–4.2)
UROBILINOGEN, URINE: 0.2 E.U./DL
WBC # BLD: 6.8 K/UL (ref 4.8–10.8)
WBC UA: 1 /HPF (ref 0–5)

## 2021-01-07 NOTE — TELEPHONE ENCOUNTER
Received a referral from Dr. Zoe Lopez office for this patient. Called patient to let her know when I have her scheduled an appointment with Zaida Boss. No answer. Left a VM regarding the appointment time/date/location/phone #.

## 2021-01-07 NOTE — TELEPHONE ENCOUNTER
Jb Lance has been denied - per Dr. Mark Peguero, send in Metformin 500 twice a day. Change the Vyvanse to Concerta 27 mg daily.

## 2021-01-08 RX ORDER — METHYLPHENIDATE HYDROCHLORIDE 27 MG/1
27 TABLET ORAL DAILY
Qty: 30 TABLET | Refills: 0 | Status: SHIPPED | OUTPATIENT
Start: 2021-01-08 | End: 2021-03-12 | Stop reason: SDUPTHER

## 2021-01-13 DIAGNOSIS — R82.90 ABNORMAL URINALYSIS: Primary | ICD-10-CM

## 2021-02-09 ENCOUNTER — OFFICE VISIT (OUTPATIENT)
Dept: NEUROLOGY | Age: 41
End: 2021-02-09
Payer: COMMERCIAL

## 2021-02-09 VITALS
BODY MASS INDEX: 34.95 KG/M2 | DIASTOLIC BLOOD PRESSURE: 74 MMHG | HEIGHT: 60 IN | WEIGHT: 178 LBS | OXYGEN SATURATION: 99 % | SYSTOLIC BLOOD PRESSURE: 110 MMHG | HEART RATE: 82 BPM

## 2021-02-09 DIAGNOSIS — R06.81 WITNESSED APNEIC SPELLS: ICD-10-CM

## 2021-02-09 DIAGNOSIS — G47.33 OBSTRUCTIVE SLEEP APNEA: Primary | ICD-10-CM

## 2021-02-09 DIAGNOSIS — R06.83 SNORING: ICD-10-CM

## 2021-02-09 DIAGNOSIS — R40.0 SOMNOLENCE, DAYTIME: ICD-10-CM

## 2021-02-09 PROCEDURE — 1036F TOBACCO NON-USER: CPT | Performed by: PHYSICIAN ASSISTANT

## 2021-02-09 PROCEDURE — 99203 OFFICE O/P NEW LOW 30 MIN: CPT | Performed by: PHYSICIAN ASSISTANT

## 2021-02-09 PROCEDURE — G8484 FLU IMMUNIZE NO ADMIN: HCPCS | Performed by: PHYSICIAN ASSISTANT

## 2021-02-09 PROCEDURE — G8417 CALC BMI ABV UP PARAM F/U: HCPCS | Performed by: PHYSICIAN ASSISTANT

## 2021-02-09 PROCEDURE — G8427 DOCREV CUR MEDS BY ELIG CLIN: HCPCS | Performed by: PHYSICIAN ASSISTANT

## 2021-02-09 NOTE — PATIENT INSTRUCTIONS
Patient education: Sleep apnea in adults       INTRODUCTION  Normally during sleep, air moves through the throat and in and out of the lungs at a regular rhythm. In a person with sleep apnea, air movement is periodically diminished or stopped. There are two types of sleep apnea: obstructive sleep apnea and central sleep apnea. In obstructive sleep apnea, breathing is abnormal because of narrowing or closure of the throat. In central sleep apnea, breathing is abnormal because of a change in the breathing control and rhythm. Sleep apnea is a serious condition that can affect a person's ability to safely perform normal daily activities and can affect long term health. Approximately 25 percent of adults are at risk for sleep apnea of some degree. Men are more commonly affected than women. Other risk factors include middle and older age, being overweight or obese, and having a small mouth and throat. This topic review focuses on the most common type of sleep apnea in adults, obstructive sleep apnea (NISREEN). HOW SLEEP APNEA OCCURS  The throat is surrounded by muscles that control the airway for speaking, swallowing, and breathing. During sleep, these muscles are less active, and this causes the throat to narrow. In most people, this narrowing does not affect breathing. In others, it can cause snoring, sometimes with reduced or completely blocked airflow. A completely blocked airway without airflow is called an obstructive apnea. Partial obstruction with diminished airflow is called a hypopnea. A person may have apnea and hypopnea during sleep. Insufficient breathing due to apnea or hypopnea causes oxygen levels to fall and carbon dioxide to rise. Because the airway is blocked, breathing faster or harder does not help to improve oxygen levels until the airway is reopened. Typically, the obstruction requires the person to awaken to activate the upper airway muscles. Once the airway is opened, the person then takes several deep breaths to catch up on breathing. As the person awakens, he or she may move briefly, snort or snore, and take a deep breath. Less frequently, a person may awaken completely with a sensation of gasping, smothering, or choking. If the person falls back to sleep quickly, he or she will not remember the event. Many people with sleep apnea are unaware of their abnormal breathing in sleep, and all patients underestimate how often their sleep is interrupted. Awakening from sleep causes sleep to be unrefreshing and causes fatigue and daytime sleepiness. Anatomic causes of obstructive sleep apnea   Most patients have NISREEN because of a small upper airway. As the bones of the face and skull develop, some people develop a small lower face, a small mouth, and a tongue that seems too large for the mouth. These features are genetically determined, which explains why NISREEN tends to cluster in families. Obesity is another major factor. Tonsil enlargement can be an important cause, especially in children. SLEEP APNEA SYMPTOMS  The main symptoms of NISREEN are loud snoring, fatigue, and daytime sleepiness. However, some people have no symptoms. For example, if the person does not have a bed partner, he or she may not be aware of the snoring. Fatigue and sleepiness have many causes and are often attributed to overwork and increasing age. As a result, a person may be slow to recognize that they have a problem. A bed partner or spouse often prompts the patient to seek medical care.   Other symptoms may include one or more of the following: ?Restless sleep  ? Awakening with choking, gasping, or smothering  ? Morning headaches, dry mouth, or sore throat  ? Waking frequently to urinate  ? Awakening unrested, groggy  ? Low energy, difficulty concentrating, memory impairment    Risk factors  Certain factors increase the risk of sleep apnea. ?Increasing age [de-identified] NISREEN occurs at all ages, but it is more common in middle and older age adults. ?Male sex  NISREEN is two times more common in men, especially in middle age. ?Obesity  The more obese a person is, the more likely he or she is to have NISREEN. ? Sedation from medication or alcohol  This interferes with the ability to awaken from sleep and can lengthen periods of apnea (no breathing), with potentially dangerous consequences. ? Abnormality of the airway. SLEEP APNEA CONSEQUENCES  Complications of sleep apnea can include daytime sleepiness and difficulty concentrating. The consequence of this is an increased risk of accidents and errors in daily activities. Studies have shown that people with severe NISREEN are more than twice as likely to be involved in a motor vehicle accident as people without these conditions. People with NISREEN are encouraged to discuss options for driving, working, and performing other high-risk tasks with a healthcare provider. In addition, people with untreated NISREEN may have an increased risk of cardiovascular problems such as high blood pressure, heart attack, abnormal heart rhythms, or stroke. This risk may be due to changes in the heart rate and blood pressure that occur during sleep. SLEEP APNEA DIAGNOSIS  The diagnosis of NISREEN is best made by a knowledgeable sleep medicine specialist who has an understanding of the individual's health issues. The diagnosis is usually based upon the person's medical history, physical examination, and testing, including:  ? A complaint of snoring and ineffective sleep ?Neck size (greater than 16 inches in men or 14 inches in women) is associated with an increased risk of sleep apnea  ? A small upper airway: difficulty seeing the throat because of a tongue that is large for the mouth  ? High blood pressure, especially if it is resistant to treatment  ? If a bed partner has observed the patient during episodes of stopped breathing (apnea), choking, or gasping during sleep, there is a strong possibility of sleep apnea. Testing is usually performed in a sleep laboratory. A full sleep study is called a polysomnogram. The polysomnogram measures the breathing effort and airflow, blood oxygen level, heart rate and rhythm, duration of the various stages of sleep, body position, and movement of the arms/legs. Home monitoring devices are available that can perform a sleep study. This is a reasonable alternative to conventional testing in a sleep laboratory if the clinician strongly suspects moderate or severe sleep apnea and the patient does not have other illnesses or sleep disorders that may interfere with the results. SLEEP APNEA TREATMENT  Sleep apnea is best treated by a knowledgeable sleep medicine specialist. The goal of treatment is to maintain an open airway during sleep. Effective treatment will eliminate the symptoms of sleep disturbance; long-term health consequences are also reduced. Most treatments require nightly use. The challenge for the clinician and the patient is to select an effective therapy that is appropriate for the patient's problem and that is acceptable for long term use. Auto-titrating CPAP delivers an amount of PAP that varies during the night. The variation is dependent on event detection software algorithms, which will increase the pressure gradually in response to flow changes until adequate patency is detected. After a period of sustained upper airway patency, the delivered level of pressure gradually decreases until the algorithm identifies recurrent upper airway obstruction, at which point the delivered pressure again increases. The result is that the delivered pressure varies throughout the night, in an effort to provide the lowest pressure that is necessary to maintain upper airway patency. Continuous positive airway pressure (CPAP)  The most effective treatment for sleep apnea uses air pressure from a mechanical device to keep the upper airway open during sleep. A CPAP (continuous positive airway pressure)  device uses an air-tight attachment to the nose, typically a mask, connected to a tube and a blower which generates the pressure. Devices that fit comfortably into the nasal opening, rather than over the nose, are also available. CPAP should be used any time the person sleeps (day or night). The CPAP device is usually used for the first time in the sleep lab, where a technician can adjust the pressure and select the best equipment to keep the airway open. Alternatively, an auto device with a self-adjusting pressure feature, provided with proper education and training, can get treatment started without another sleep test. While the treatment may seem uncomfortable, noisy, or bulky at first, most people accept the treatment after experiencing better sleep. However, difficulty with mask comfort and nasal congestion prevent up to 50 percent of people from using the treatment on a regular basis. Continued follow up with a healthcare provider helps to ensure that the treatment is effective and comfortable. Information from the CPAP machine is often used by physicians, therapists, and insurers to track the success of treatment. CPAP can be delivered with different features to improve comfort and solve problems that may come up during treatment. Changes in treatment may be needed if symptoms do not improve or if the persons condition changes, such as a gain or loss of weight. Adjust sleep position  Adjusting sleep position (to stay off the back) may help improve sleep quality in people who have NISREEN when sleeping on the back. However, this is difficult to maintain throughout the night and is rarely an adequate solution. Weight loss  Weight loss may be helpful for obese or overweight patients. Weight loss may be accomplished with dietary changes, exercise, and/or surgical treatment. However, it can be difficult to maintain weight loss; the five-year success of non-surgical weight loss is only 5 percent, meaning that 95 percent of people regain lost weight. Avoid alcohol and other sedatives  Alcohol can worsen sleepiness, potentially increasing the risk of accidents or injury. People with NISREEN are often counseled to drink little to no alcohol, even during the daytime. Similarly, people who take anti-anxiety medications or sedatives to sleep should speak with their healthcare provider about the safety of these medications. People with NISREEN must notify all healthcare providers, including surgeons, about their condition and the potential risks of being sedated. People with NISREEN who are given anesthesia and/or pain medications require special management and close monitoring to reduce the risk of a blocked airway. Dental devices  A dental device, called an oral appliance or mandibular advancement device, can reposition the jaw (mandible), bringing the tongue and soft palate forward as well. This may relieve obstruction in some people. This treatment is excellent for reducing snoring, although the effect on NISREEN is sometimes more limited. As a result, dental devices are best used for mild cases of NISREEN when relief of snoring is the main goal. Failure to tolerate and accept CPAP is another indication for dental devices. While dental devices are not as effective as CPAP for NISREEN, some patients prefer a dental device to CPAP. Side effects of dental devices are generally minor but may include changes to the bite with prolonged use. Surgical treatment  Surgery is an alternative therapy for patients who cannot tolerate or do not improve with nonsurgical treatments such as CPAP or oral devices. Surgery can also be used in combination with other nonsurgical treatments. Surgical procedures reshape structures in the upper airways or surgically reposition bone or soft tissue. Uvulopalatopharyngoplasty (UPPP) removes the uvula and excessive tissue in the throat, including the tonsils, if present. Other procedures, such as maxillomandibular advancement (MMA), address both the upper and lower pharyngeal airway more globally. UPPP alone has limited success rates (less than 50 percent) and people can relapse (when NISREEN symptoms return after surgery). As a result, this surgery is only recommended in a minority of people and should be considered with caution. MMA may have a higher success rate, particularly in people with abnormal jaw (maxilla and mandible) anatomy, but it is the most complicated procedure. A newer surgical approach, nerve stimulation to protrude the tongue, has promising success rates in very selected people. [place], [address]   [email or web address]   [email or web address]   Tel: [phone]   Fax: [facility] that works to [employer] and safety by promoting public understanding of sleep and sleep disorders. Supports sleep-related education, research, and advocacy; produces and distributes educational materials to the public and healthcare professionals; and offers postdoctoral fellowships and grants for sleep researchers. David Goldman [address]   [email or web address]   [email or web address]"[employer]".[email or web address]   Tel: [phone]   Fax: [phone]    Important information:  Medicare/private insurance CPAP/BiPAP/APAP requirements:  Medicare/private insurance has specific requirements for PAP compliance that must be met during the first 90 days of use to continue coverage for CPAP/BiPAP/APAP  from day 91 and beyond. The policy requires that patients use a PAP device 4 hours per 24 hour period, at least 70% of the time over a 30 day period. This data must be downloaded as a report direct from the PAP devices. This is called a compliance download. Your PAP supplier will assist you in this matter. Reminder:  Please bring a copy of the compliance download to your next office visit or have your supplier fax it to our office prior to your office visit. Note:  Where applicable, we will utilize PAP device efficiency reports, additional testing, and face-to-face  clinical evaluation subsequent to any treatment, changes in treatment, and continued treatment. Caution:  Please abstain from driving or engaging in other activities which may be hazardous in the presence of diminished alertness or daytime drowsiness. And avoid the use of sedatives or alcohol, which can worsen sleep apnea and daytime drowsiness.        Mask suggestions:  - Resmed Airfit N20 (Nasal) or F20 (Full face mask). They conform to your face, thus decreasing the potential for mask leakage. You might like the AirTouch F20(full face mask). It has a \"memory foam\" like cushion. The AirFit F30 is a smaller style full face mask designed to sit low on and cover less of your face for fewer facial marks. AirFit N30i has a top of the head tube with a nasal mask. AirFit P10 reported to be the most comfortable nasal pillow mask. Resmed Mirage FX reported to be the most comfortable nasal mask. Resmed Mirage [place] reported to be the most comfortable hybrid mask. AirTouch N20-memory foam nasal mask. Respironics: You might also like to try a nasal mask called a Dreamwear nasal mask or the Dreamwear nasal pillow. Another suggestion is the [facility], it is a minimal contact full face mask. The Lajuanda Eron incredible under the nose design makes it the only full face mask that won't cause red marks on the bridge of your nose when compared to other full face masks. The Dreamwear full face mask has a  soft feel, unique in-frame air-flow, and innovative air tube connection at the top of the head for the ultimate in sleep comfort. Comfort Gel Blue. Dreamwear gel pillows. Josh [employer] Kim: Brevida nasal pillow mask and Simplus FFM    The use of a memory foam CPAP pillow supports the head and neck throughout the night.

## 2021-02-09 NOTE — PROGRESS NOTES
[facility]  [address] [place] [address] [address], [address]  Jhon Delgado  Phone [phone]  Fax [phone] SLEEP    Patient: April juan miguel  :  1980  Age:  45 y.o. MRN:  [id number]  Today:             21    Provider:  Kate Cesar PA-C    Chief Complaint:  Chief Complaint   Patient presents with    Consultation     New patient referral from Dr Nicolas Van to evaluate patient for possible sleep apnea. Patient notes that her mother and  have noted that she snores quite a bit and they tell her she requires too much sleep even when she thinks that she has gotten a full nights rest.        History Source: History obtained from chart review and the patient. PCP: Marilyn Adame MD     Referring Provider: Marilyn Adame MD  [address], [place] [address]    HISTORY OF PRESENT ILLNESS:   April [address] Etienne is a 45y.o. year old female with a history of anxiety, IBS, ADD, and post partum depression who was referred for a sleep evaluation. Today she reports a long history of snoring, witnessed apneas, and excessive daytime somnolence. She does not have difficulty initiating sleep but does have difficulty maintaining sleep. She sleeps 5 hours and awakens at 2:00 am then she is unable to fall back to sleep until 5:00 am. Her sleep is non-restorative. She has been known to sweat during sleep. She does not awaken with a gasp or choke. She has gained weight over the years. She takes Concerta Monday through Friday when she is in school.       PAST MEDICAL HITORY:    Medical History:  Past Medical History:   Diagnosis Date    Anxiety     IBS (irritable bowel syndrome)     Post partum depression        Surgical History:  Past Surgical History:   Procedure Laterality Date    BREAST ENHANCEMENT SURGERY       SECTION  6182,4082    CHOLECYSTECTOMY         Current Medications:  Current Outpatient Medications   Medication Sig Dispense Refill Neurological:[]? Visual Disturbance []? Double Vision []? Slurred Speech []? Trouble swallowing  []? Vertigo []? Tingling []? Numbness []? Weakness []? Loss of Balance   []? Loss of Consciousness []? Memory Loss []? Seizures  [x]? Denies all unless marked  Psychiatric/Behavioral:[]? Depression []? Anxiety  [x]? Denies all unless marked  Sleep: []? Insomnia [x]? Sleep Disturbance [x]? Snoring []? Restless Legs [x]? Daytime Sleepiness []? Sleep Apnea  []? Denies all unless marked     The MA has completed the ROS with the patient. I have reviewed it in its' entirety with the patient and agree with the documentation. PHYSICAL EXAM  /74   Pulse 82   Ht 5' (1.524 m)   Wt 178 lb (80.7 kg)   SpO2 99%   BMI 34.76 kg/m²    Neck circumference:  14.5 inches  Mallampati: Type 4   Constitutional   Alert in NAD, well developed, pleasant and cooperative with exam; body habitus obese as indicated by BMI  HEENT- Conjunctiva normal.  No scars, masses, or lesions over external nose or ears, hearing intact, no neck masses noted, no jugular vein distension, no bruit  Cardiac- Regular rate and rhythm  Pulmonary- Clear to auscultation, good expansion, normal effort without use of accessory muscles  Musculoskeletal  No significant wasting of muscles noted, no bony deformities  Extremities - No clubbing, cyanosis or edema  Skin  Warm, dry, and intact. No rash, erythema, or pallor  Psychiatric  Mood, affect, and behavior appear normal      NEUROLOGIC EXAMINATION:  Extraocular movements are intact without nystagmus. PERRL. Visual fields are full to confrontation. Facial movements are symmetrical and normal.  Speech is precise. Extremity strength is normal in both uppers and lowers. Deep tendon reflexes are intact and symmetrical.  Rapid alternating movements are unimpaired. Finger-to-nose testing is performed well, without dysmetria.   Gait is normal.    I reviewed the following studies: []  :  Clinical laboratory test results     []  :  Radiology reports                    [x]  :  Review and summarization of medical records and/or obtain medical records        []  :  Previous/recent polysomnogram report(s)     []  :  [place] Sleepiness Scale: 9      [place] Sleepiness Scale    0 = would never doze  1 = slight chance of dozing  2 = moderate chance of dozing  3 = high chance of dozing    Situation Chance of Dozing (0-3)    Sitting and reading       2    Watching TV        3    Sitting, inactive in a public place (e.g. a theatre or a meeting) 0    As a passenger in a car for an hour without a break   1    Lying down to rest in the afternoon when circumstances permit 2    Sitting and talking to someone     0    Sitting quietly after a lunch without alcohol    1    In a car, while stopped for a few minutes in the traffic  0    Total:  9                                                                                                                           IMPRESSION:    ICD-10-CM    1. Obstructive sleep apnea  G47.33 Home Sleep Study     [facility]   2. Somnolence, daytime  R40.0 Home Sleep Study     [facility]   3. Snoring  R06.83 Home Sleep Study     [facility]   4. Witnessed apneic spells  R06.81 [place] [facility] Study     [facility]            PLAN:  1. Orders Placed This Encounter   Procedures   [address] [address] [place] [facility] Study     2. Patient advised of the etiology,  pathophysiology, diagnosis, treatment options, and risks of untreated NISREEN. Risks may include, but are not limited to  hypertension, coronary artery disease, diabetes, stroke, weight gain, impaired cognition, daytime somnolence,  and motor vehicle accidents. Advised to abstain from driving or operating heavy machinery when drowsy and the use of respiratory suppressants. 3.  We had a discussion about the clinical issues and went over the various important aspects to consider. Treatment plan and potential diagnostic studies were discussed. All questions were answered. Pt voiced understanding and agrees with treatment plan. 4. The following educational material has been included in this visit after visit summary for your review:  NISREEN/PAP guidelines/sleep studies/CPAP-discussed with pt.  5.  If pt requires a PAP device she will be required to be evaluated for clinical benefit and  compliance during a 30 day period within the preceding 90 days of set up. 6.  Order-HST  7.   Follow up per protocol

## 2021-02-09 NOTE — PROGRESS NOTES
REVIEW OF SYSTEMS    Constitutional: []Fever []Sweats []Chills [] Recent Injury   [x] Denies all unless marked  HENT:[]Headache  [] Head Injury  [] Sore Throat  [] Ear Pain  [] Dizziness [] Hearing Loss   [x] Denies all unless marked  Musculoskeletal: [] Arthralgia  [] Myalgias [] Muscle cramps  [] Muscle twitches   [x] Denies all unless marked   Spine:  [] Neck pain  [] Back pain  [] Sciaticia  [x] Denies all unless marked  Neurological:[] Visual Disturbance [] Double Vision [] Slurred Speech [] Trouble swallowing  [] Vertigo [] Tingling [] Numbness [] Weakness [] Loss of Balance   [] Loss of Consciousness [] Memory Loss [] Seizures  [x] Denies all unless marked  Psychiatric/Behavioral:[] Depression [] Anxiety  [x] Denies all unless marked  Sleep: []  Insomnia [x] Sleep Disturbance [x] Snoring [] Restless Legs [x] Daytime Sleepiness [] Sleep Apnea  [] Denies all unless marked

## 2021-02-26 ENCOUNTER — HOSPITAL ENCOUNTER (OUTPATIENT)
Dept: SLEEP CENTER | Age: 41
Discharge: HOME | End: 2021-02-28
Payer: COMMERCIAL

## 2021-02-26 PROCEDURE — G0399 HOME SLEEP TEST/TYPE 3 PORTA: HCPCS

## 2021-02-26 NOTE — PROGRESS NOTES
Pt in the office to  HST monitor. Pt was educated on how to use equipment properly and instructed to wear for 2 nights and to return on Monday. Pt states she understood.

## 2021-02-27 PROCEDURE — G0399 HOME SLEEP TEST/TYPE 3 PORTA: HCPCS

## 2021-03-12 DIAGNOSIS — F90.9 ATTENTION DEFICIT HYPERACTIVITY DISORDER (ADHD), UNSPECIFIED ADHD TYPE: ICD-10-CM

## 2021-03-12 DIAGNOSIS — F41.9 ANXIETY: ICD-10-CM

## 2021-03-15 RX ORDER — METHYLPHENIDATE HYDROCHLORIDE 27 MG/1
27 TABLET ORAL DAILY
Qty: 30 TABLET | Refills: 0 | Status: SHIPPED | OUTPATIENT
Start: 2021-03-15 | End: 2021-05-10 | Stop reason: SDUPTHER

## 2021-03-15 RX ORDER — CLONAZEPAM 0.5 MG/1
0.5 TABLET ORAL 3 TIMES DAILY PRN
Qty: 60 TABLET | Refills: 0 | Status: SHIPPED | OUTPATIENT
Start: 2021-03-15 | End: 2021-06-14 | Stop reason: SDUPTHER

## 2021-03-27 PROCEDURE — 95806 SLEEP STUDY UNATT&RESP EFFT: CPT | Performed by: PSYCHIATRY & NEUROLOGY

## 2021-04-30 ENCOUNTER — OFFICE VISIT (OUTPATIENT)
Dept: NEUROLOGY | Age: 41
End: 2021-04-30
Payer: COMMERCIAL

## 2021-04-30 VITALS
DIASTOLIC BLOOD PRESSURE: 68 MMHG | OXYGEN SATURATION: 98 % | SYSTOLIC BLOOD PRESSURE: 105 MMHG | BODY MASS INDEX: 34.95 KG/M2 | HEIGHT: 60 IN | WEIGHT: 178 LBS | HEART RATE: 76 BPM | TEMPERATURE: 97.4 F

## 2021-04-30 DIAGNOSIS — G47.33 OBSTRUCTIVE SLEEP APNEA: Primary | ICD-10-CM

## 2021-04-30 DIAGNOSIS — G25.81 RESTLESS LEG SYNDROME: ICD-10-CM

## 2021-04-30 PROCEDURE — 99214 OFFICE O/P EST MOD 30 MIN: CPT | Performed by: PHYSICIAN ASSISTANT

## 2021-04-30 PROCEDURE — 1036F TOBACCO NON-USER: CPT | Performed by: PHYSICIAN ASSISTANT

## 2021-04-30 PROCEDURE — G8417 CALC BMI ABV UP PARAM F/U: HCPCS | Performed by: PHYSICIAN ASSISTANT

## 2021-04-30 PROCEDURE — G8427 DOCREV CUR MEDS BY ELIG CLIN: HCPCS | Performed by: PHYSICIAN ASSISTANT

## 2021-04-30 RX ORDER — ROPINIROLE 0.5 MG/1
TABLET, FILM COATED ORAL
Qty: 60 TABLET | Refills: 5 | Status: SHIPPED | OUTPATIENT
Start: 2021-04-30 | End: 2022-05-26 | Stop reason: SDUPTHER

## 2021-04-30 NOTE — PROGRESS NOTES
[id number] [facility] Neurology and Sleep Medicine  [address] [place] [address] Drive, [address]  [place], [address]  Phone [phone]  Fax [phone]       [facility] Sleep Follow Up Encounter      Information:   Patient Name: Charli Franco  :   1980  Age:   45 y.o. MRN:   [id number]  Account #:  [de-identified]  Today:                21    Provider:  Olivia Titus PA-C    Chief Complaint   Patient presents with    Sleep Apnea     follow up to go over sleep results         Subjective:   Capri Lovett is a 45 y.o. female  with a history of NISREEN and RLS who comes in for a sleep clinic follow up. She was referred for a HST due to snoring and excessive daytime somnolence. The HST, 2021 revealed an AHI of 9.2. The HST, 2021 revealed an AHI of 12.2. She presents today to discuss sleep study results.      Location or symptom:  NISREEN  Onset:  HST: 2021   Timing:  q hs  Severity:  Mild  Associated:  Snoring, witnessed apneas, and excessive daytime somnolence  Alleviated:  Considering options      Objective:     Past Medical History:   Diagnosis Date    Anxiety     Attention deficit disorder (ADD) without hyperactivity     IBS (irritable bowel syndrome)     Obstructive sleep apnea     Post partum depression        Past Surgical History:   Procedure Laterality Date    BREAST ENHANCEMENT SURGERY       SECTION  5711,0015    CHOLECYSTECTOMY         Recent Hospitalizations  ·     Significant Injuries  ·     Family History   Problem Relation Age of Onset    Diabetes Mother     High Blood Pressure Father     Other Paternal Grandmother         over produced platelets    Colon Cancer Paternal Grandfather     Cancer Paternal Grandfather         bladder       Social History  Social History     Tobacco Use   Smoking Status Former Smoker    Packs/day: 0.50    Years: 5.00    Pack years: 2.50    Quit date: 2009    Years since quittin.7   Smokeless Tobacco Never Used     Social History     Substance and Sexual Activity Alcohol Use None     Social History     Substance and Sexual Activity   Drug Use Not on file         Current Outpatient Medications   Medication Sig Dispense Refill    rOPINIRole (REQUIP) 0.5 MG tablet 1-2 q hs 60 tablet 5    metFORMIN (GLUCOPHAGE) 500 MG tablet Take 1 tablet by mouth 2 times daily (with meals) 60 tablet 1    buPROPion (WELLBUTRIN XL) 300 MG extended release tablet Take 1 tablet by mouth every morning 30 tablet 5    triamcinolone (KENALOG) 0.1 % lotion Apply topically 3 times daily to a skin lesion on her leg. 1 Bottle 0    hyoscyamine (ANASPAZ;LEVSIN) 125 MCG tablet Take 1 tablet by mouth every 4 hours as needed for Cramping 180 tablet 3    clonazePAM (KLONOPIN) 0.5 MG tablet Take 1 tablet by mouth 3 times daily as needed for Anxiety for up to 30 days. 60 tablet 0    methylphenidate (CONCERTA) 27 MG extended release tablet Take 1 tablet by mouth daily for 30 days. 30 tablet 0     No current facility-administered medications for this visit. Allergies:  Betadine [povidone iodine]    REVIEW OF SYSTEMS     Constitutional: []? Fever []? Sweats []? Chills []? Recent Injury   [x]? Denies all unless marked  HENT:[]? Headache  []? Head Injury  []? Sore Throat  []? Ear Pain  []? Dizziness []? Hearing Loss   [x]? Denies all unless marked  Musculoskeletal: []? Arthralgia  []? Myalgias []? Muscle cramps  []? Muscle twitches   [x]? Denies all unless marked   Spine:  []? Neck pain  []? Back pain  []? Sciaticia  [x]? Denies all unless marked  Neurological:[]? Visual Disturbance []? Double Vision []? Slurred Speech []? Trouble swallowing  []? Vertigo []? Tingling []? Numbness []? Weakness []? Loss of Balance   []? Loss of Consciousness []? Memory Loss []? Seizures  [x]? Denies all unless marked  Psychiatric/Behavioral:[]? Depression []? Anxiety  [x]? Denies all unless marked  Sleep: []? Insomnia []? Sleep Disturbance []? Snoring [x]? Restless Legs []? Daytime Sleepiness [x]? Sleep Apnea  [x]?  Denies all unless marked    The MA has completed the ROS with the patient. I have reviewed it in its' entirety with the patient and agree with the documentation. PHYSICAL EXAM  /68 (Site: Left Upper Arm, Position: Sitting, Cuff Size: Medium Adult)   Pulse 76   Temp 97.4 °F (36.3 °C)   Ht 5' (1.524 m)   Wt 178 lb (80.7 kg)   SpO2 98%   Breastfeeding No   BMI 34.76 kg/m²      Constitutional   Alert in NAD, well developed, pleasant and cooperative with exam; body habitus obese as indicated by BMI  HEENT- Conjunctiva normal.  No scars, masses, or lesions over external nose or ears, hearing intact, no neck masses noted, no jugular vein distension, no bruit  Cardiac- Regular rate and rhythm  Pulmonary- Clear to auscultation, good expansion, normal effort without use of accessory muscles  Musculoskeletal  No significant wasting of muscles noted, no bony deformities  Extremities - No clubbing, cyanosis or edema  Skin  Warm, dry, and intact. No rash, erythema, or pallor  Psychiatric  Mood, affect, and behavior appear normal      Neurologic:  Extraocular movements are intact without nystagmus. PERRL. Visual fields are full to confrontation. Facial movements are symmetrical and normal.  Speech is precise. Extremity strength is normal in both uppers and lowers. Deep tendon reflexes are intact and symmetrical.  Rapid alternating movements are unimpaired. Finger-to-nose testing is performed well, without dysmetria. Gait is normal.    I reviewed the following studies:       []  :  Clinical laboratory test results     []  :  Radiology reports                    [x]  :  Review and summarization of medical records and/or obtain medical records        [x]  :  Previous/recent polysomnogram report(s)     []  :  [place] Sleepiness Scale        Assessment:       ICD-10-CM    1. Obstructive sleep apnea  G47.33    2.  Restless leg syndrome  G25.81 rOPINIRole (REQUIP) 0.5 MG tablet          []  :  Stable     []  :  Improved []  :  Well controlled              []  :  Resolving     []  :  Resolved     []  :  Inadequately controlled     []  :  Worsening     [x]  :  Additional workup planned-She plans to consult with a dentist re: a mandibular advancement device          Plan:     Orders Placed This Encounter   Medications    rOPINIRole (REQUIP) 0.5 MG tablet     Si-2 q hs     Dispense:  60 tablet     Refill:  5       1. Previously or presently advised of the etiology,  pathophysiology, diagnosis, treatment options, and risks of untreated NISREEN. Risks may include, but are not limited to  hypertension, coronary artery disease, diabetes, stroke, weight gain, impaired cognition, daytime somnolence, and motor vehicle accidents. Advised to abstain from driving or operating heavy machinery when drowsy and the use of respiratory suppressants. Discussed diagnostic studies and potential treatment plan. Will evaluate for clinical benefit and and compliance during a 30 day period within the preceding 90 days PRN. 2.  The following educational material has been included in this visit after visit summary for your review: NISREEN/PAP guidelines/RLS-Discussed with the patient and all questions fully answered. 3.  Treatment options includes the use of an APAP during sleep, use of a mandibular advancement oral device, and surgery. 4.  Discussed the signs, symptoms, and treatment options for RLS/PLMD-will start ropinirole 0.5 mg  5. She plans to proceed with consulting dentist for a mandibular advancement device; I advised that she should have a follow up sleep study with the device and that the dentist typically provide this service  6.   Follow up in 6 months

## 2021-04-30 NOTE — PATIENT INSTRUCTIONS
Patient Education        Restless Legs Syndrome: Care Instructions  Your Care Instructions  Restless legs syndrome is a common nervous system problem. People with this syndrome feel a creeping, achy, or unpleasant feeling in the legs and an overpowering urge to move them. It often occurs in the evening and at night and can lead to sleep problems and tiredness. Your doctor may suggest doing a study of your sleep patterns to figure out what is happening when you try to sleep. Many people get relief from symptoms when they get regular exercise, eat well, and avoid caffeine, alcohol, and tobacco.  Follow-up care is a key part of your treatment and safety. Be sure to make and go to all appointments, and call your doctor if you are having problems. It's also a good idea to know your test results and keep a list of the medicines you take. How can you care for yourself at home? · Take your medicines exactly as prescribed. Call your doctor if you think you are having a problem with your medicine. · Try bathing in hot or cold water. Applying a heating pad or ice bag to your legs may also help symptoms. · Stretch and massage your legs before bed or when discomfort begins. · Get some exercise for at least 30 minutes a day on most days of the week. Stop exercising at least 3 hours before bedtime. · Try to plan for situations where you will need to remain seated for long stretches. For example, if you are traveling by car, plan some stops so you can get out and walk around. · Tell your doctor about any medicines you are taking. This includes all over-the-counter, prescription, and herbal medicines. Some medicines, such as antidepressants, antihistamines, and cold and sinus medicines, can make your symptoms worse. · Avoid caffeine products, such as coffee, tea, cola, and chocolate. Caffeine can interrupt your sleep and stimulate you. · Do not smoke. Nicotine can make restless legs worse.  If you need help quitting, talk to your doctor about stop-smoking programs and medicines. These can increase your chances of quitting for good. · Do not drink alcohol late in the evening. Take steps to help you sleep better  · Get plenty of sunlight in the outdoors, particularly later in the afternoon. · Use the evening hours for settling down. Avoid activities that challenge you in the hours before bedtime. · Eat meals at regular times, and do not snack before bedtime. · Keep your bedroom quiet, dark, and cool. Try using a sleep mask and earplugs to help you sleep. · Limit how much you drink at night to reduce your need to get up to urinate. But do not go to bed thirsty. · Run a fan or other steady \"white noise\" during the night if noises wake you up. · [place] the bed for sleeping and sex. Do your reading or TV watching in another room. · Once you are in bed, relax from head to toe, and guide your mind to pleasant thoughts. · Do not stay in bed longer than 8 hours, and try to avoid naps. When should you call for help? Watch closely for changes in your health, and be sure to contact your doctor if:    · You are still not getting enough sleep.     · Your symptoms become more severe or happen more often. Where can you learn more? Go to [email or web address]://[email or web address] org and sign in to your [employer] account. Enter F476 in the [facility] box to learn more about \"Restless Legs Syndrome: Care Instructions. \"     If you do not have an account, please click on the \"Sign Up Now\" link. Current as of: August 4, 2020               Content Version: 12.8  © 7016-4718 Healthwise, [employer]. Care instructions adapted under license by [facility] ([facility]). If you have questions about a medical condition or this instruction, always ask your healthcare professional. [place] [address] any warranty or liability for your use of this information.        Patient education: Sleep apnea in adults       INTRODUCTION  Normally during sleep, air moves through the throat and in and out of the lungs at a regular rhythm. In a person with sleep apnea, air movement is periodically diminished or stopped. There are two types of sleep apnea: obstructive sleep apnea and central sleep apnea. In obstructive sleep apnea, breathing is abnormal because of narrowing or closure of the throat. In central sleep apnea, breathing is abnormal because of a change in the breathing control and rhythm. Sleep apnea is a serious condition that can affect a person's ability to safely perform normal daily activities and can affect long term health. Approximately 25 percent of adults are at risk for sleep apnea of some degree. Men are more commonly affected than women. Other risk factors include middle and older age, being overweight or obese, and having a small mouth and throat. This topic review focuses on the most common type of sleep apnea in adults, obstructive sleep apnea (NISREEN). HOW SLEEP APNEA OCCURS  The throat is surrounded by muscles that control the airway for speaking, swallowing, and breathing. During sleep, these muscles are less active, and this causes the throat to narrow. In most people, this narrowing does not affect breathing. In others, it can cause snoring, sometimes with reduced or completely blocked airflow. A completely blocked airway without airflow is called an obstructive apnea. Partial obstruction with diminished airflow is called a hypopnea. A person may have apnea and hypopnea during sleep. Insufficient breathing due to apnea or hypopnea causes oxygen levels to fall and carbon dioxide to rise. Because the airway is blocked, breathing faster or harder does not help to improve oxygen levels until the airway is reopened. Typically, the obstruction requires the person to awaken to activate the upper airway muscles. Once the airway is opened, the person then takes several deep breaths to catch up on breathing.  As the person awakens, he or she may move briefly, snort or snore, and take a deep breath. Less frequently, a person may awaken completely with a sensation of gasping, smothering, or choking. If the person falls back to sleep quickly, he or she will not remember the event. Many people with sleep apnea are unaware of their abnormal breathing in sleep, and all patients underestimate how often their sleep is interrupted. Awakening from sleep causes sleep to be unrefreshing and causes fatigue and daytime sleepiness. Anatomic causes of obstructive sleep apnea   Most patients have NISREEN because of a small upper airway. As the bones of the face and skull develop, some people develop a small lower face, a small mouth, and a tongue that seems too large for the mouth. These features are genetically determined, which explains why NISREEN tends to cluster in families. Obesity is another major factor. Tonsil enlargement can be an important cause, especially in children. SLEEP APNEA SYMPTOMS  The main symptoms of NISREEN are loud snoring, fatigue, and daytime sleepiness. However, some people have no symptoms. For example, if the person does not have a bed partner, he or she may not be aware of the snoring. Fatigue and sleepiness have many causes and are often attributed to overwork and increasing age. As a result, a person may be slow to recognize that they have a problem. A bed partner or spouse often prompts the patient to seek medical care. Other symptoms may include one or more of the following:  ?Restless sleep  ? Awakening with choking, gasping, or smothering  ? Morning headaches, dry mouth, or sore throat  ? Waking frequently to urinate  ? Awakening unrested, groggy  ? Low energy, difficulty concentrating, memory impairment    Risk factors  Certain factors increase the risk of sleep apnea. ?Increasing age [de-identified] NISREEN occurs at all ages, but it is more common in middle and older age adults.   ?Male sex  NISREEN is two times more common in men, especially in middle age. ?Obesity  The more obese a person is, the more likely he or she is to have NISREEN. ? Sedation from medication or alcohol  This interferes with the ability to awaken from sleep and can lengthen periods of apnea (no breathing), with potentially dangerous consequences. ? Abnormality of the airway. SLEEP APNEA CONSEQUENCES  Complications of sleep apnea can include daytime sleepiness and difficulty concentrating. The consequence of this is an increased risk of accidents and errors in daily activities. Studies have shown that people with severe NISREEN are more than twice as likely to be involved in a motor vehicle accident as people without these conditions. People with NISREEN are encouraged to discuss options for driving, working, and performing other high-risk tasks with a healthcare provider. In addition, people with untreated NISREEN may have an increased risk of cardiovascular problems such as high blood pressure, heart attack, abnormal heart rhythms, or stroke. This risk may be due to changes in the heart rate and blood pressure that occur during sleep. SLEEP APNEA DIAGNOSIS  The diagnosis of NISREEN is best made by a knowledgeable sleep medicine specialist who has an understanding of the individual's health issues. The diagnosis is usually based upon the person's medical history, physical examination, and testing, including:  ? A complaint of snoring and ineffective sleep  ? Neck size (greater than 16 inches in men or 14 inches in women) is associated with an increased risk of sleep apnea  ? A small upper airway: difficulty seeing the throat because of a tongue that is large for the mouth  ? High blood pressure, especially if it is resistant to treatment  ? If a bed partner has observed the patient during episodes of stopped breathing (apnea), choking, or gasping during sleep, there is a strong possibility of sleep apnea. Testing is usually performed in a sleep laboratory.  A full sleep study is called a polysomnogram. The polysomnogram measures the breathing effort and airflow, blood oxygen level, heart rate and rhythm, duration of the various stages of sleep, body position, and movement of the arms/legs. Home monitoring devices are available that can perform a sleep study. This is a reasonable alternative to conventional testing in a sleep laboratory if the clinician strongly suspects moderate or severe sleep apnea and the patient does not have other illnesses or sleep disorders that may interfere with the results. SLEEP APNEA TREATMENT  Sleep apnea is best treated by a knowledgeable sleep medicine specialist. The goal of treatment is to maintain an open airway during sleep. Effective treatment will eliminate the symptoms of sleep disturbance; long-term health consequences are also reduced. Most treatments require nightly use. The challenge for the clinician and the patient is to select an effective therapy that is appropriate for the patient's problem and that is acceptable for long term use. Auto-titrating CPAP delivers an amount of PAP that varies during the night. The variation is dependent on event detection software algorithms, which will increase the pressure gradually in response to flow changes until adequate patency is detected. After a period of sustained upper airway patency, the delivered level of pressure gradually decreases until the algorithm identifies recurrent upper airway obstruction, at which point the delivered pressure again increases. The result is that the delivered pressure varies throughout the night, in an effort to provide the lowest pressure that is necessary to maintain upper airway patency. Continuous positive airway pressure (CPAP)  The most effective treatment for sleep apnea uses air pressure from a mechanical device to keep the upper airway open during sleep.  A CPAP (continuous positive airway pressure)  device uses an air-tight attachment to the nose, typically a mask, connected to a tube and a blower which generates the pressure. Devices that fit comfortably into the nasal opening, rather than over the nose, are also available. CPAP should be used any time the person sleeps (day or night). The CPAP device is usually used for the first time in the sleep lab, where a technician can adjust the pressure and select the best equipment to keep the airway open. Alternatively, an auto device with a self-adjusting pressure feature, provided with proper education and training, can get treatment started without another sleep test. While the treatment may seem uncomfortable, noisy, or bulky at first, most people accept the treatment after experiencing better sleep. However, difficulty with mask comfort and nasal congestion prevent up to 50 percent of people from using the treatment on a regular basis. Continued follow up with a healthcare provider helps to ensure that the treatment is effective and comfortable. Information from the CPAP machine is often used by physicians, therapists, and insurers to track the success of treatment. CPAP can be delivered with different features to improve comfort and solve problems that may come up during treatment. Changes in treatment may be needed if symptoms do not improve or if the persons condition changes, such as a gain or loss of weight. Adjust sleep position  Adjusting sleep position (to stay off the back) may help improve sleep quality in people who have NISREEN when sleeping on the back. However, this is difficult to maintain throughout the night and is rarely an adequate solution. Weight loss  Weight loss may be helpful for obese or overweight patients. Weight loss may be accomplished with dietary changes, exercise, and/or surgical treatment. However, it can be difficult to maintain weight loss; the five-year success of non-surgical weight loss is only 5 percent, meaning that 95 percent of people regain lost weight.    Avoid alcohol and other sedatives  Alcohol can worsen sleepiness, potentially increasing the risk of accidents or injury. People with NISREEN are often counseled to drink little to no alcohol, even during the daytime. Similarly, people who take anti-anxiety medications or sedatives to sleep should speak with their healthcare provider about the safety of these medications. People with NISREEN must notify all healthcare providers, including surgeons, about their condition and the potential risks of being sedated. People with NISREEN who are given anesthesia and/or pain medications require special management and close monitoring to reduce the risk of a blocked airway. Dental devices  A dental device, called an oral appliance or mandibular advancement device, can reposition the jaw (mandible), bringing the tongue and soft palate forward as well. This may relieve obstruction in some people. This treatment is excellent for reducing snoring, although the effect on NISREEN is sometimes more limited. As a result, dental devices are best used for mild cases of NISREEN when relief of snoring is the main goal. Failure to tolerate and accept CPAP is another indication for dental devices. While dental devices are not as effective as CPAP for NISREEN, some patients prefer a dental device to CPAP. Side effects of dental devices are generally minor but may include changes to the bite with prolonged use. Surgical treatment  Surgery is an alternative therapy for patients who cannot tolerate or do not improve with nonsurgical treatments such as CPAP or oral devices. Surgery can also be used in combination with other nonsurgical treatments. Surgical procedures reshape structures in the upper airways or surgically reposition bone or soft tissue. Uvulopalatopharyngoplasty (UPPP) removes the uvula and excessive tissue in the throat, including the tonsils, if present.  Other procedures, such as maxillomandibular advancement (MMA), address both the upper and lower pharyngeal new issues arise. The purpose of long-term follow-up is to assess usage and monitor for recurrent NISREEN, new side effects, air leakage, and fluctuations in body weight. WHERE TO GET MORE INFORMATION  Your healthcare provider is the best source of information for questions and concerns related to your medical problem. Organizations  American Sleep Apnea Association  Provides information about sleep apnea to the public, publishes a newsletter, and serves as an advocate for people with the disorder. [place], [address]   Isaias@[employer]. org   [employer].. org   Tel: [phone]   Fax: [facility] organization that works to [employer] and safety by promoting public understanding of sleep and sleep disorders. Supports sleep-related education, research, and advocacy; produces and distributes educational materials to the public and healthcare professionals; and offers postdoctoral fellowships and grants for sleep researchers. [address]   Em@[employer]. org   [employer]. org   Tel: [phone]   Fax: [phone]    Important information:  Medicare/private insurance CPAP/BiPAP/APAP requirements:  Medicare/private insurance has specific requirements for PAP compliance that must be met during the first 90 days of use to continue coverage for CPAP/BiPAP/APAP  from day 91 and beyond. The policy requires that patients use a PAP device 4 hours per 24 hour period, at least 70% of the time over a 30 day period. This data must be downloaded as a report direct from the PAP devices. This is called a compliance download. Your PAP supplier will assist you in this matter. Reminder:  Please bring a copy of the compliance download to your next office visit or have your supplier fax it to our office prior to your office visit.        Note:  Where applicable, we will utilize PAP device efficiency reports, additional testing, and face-to-face  clinical evaluation subsequent to any treatment, changes in treatment, and continued treatment. Caution:  Please abstain from driving or engaging in other activities which may be hazardous in the presence of diminished alertness or daytime drowsiness. And avoid the use of sedatives or alcohol, which can worsen sleep apnea and daytime drowsiness. Mask suggestions:  -     Resmed Airfit N20 (Nasal) or F20 (Full face mask). They conform to your face, thus decreasing the potential for mask leakage. You might like the AirTouch F20(full face mask). It has a \"memory foam\" like cushion. The AirFit F30 is a smaller style full face mask designed to sit low on and cover less of your face for fewer facial marks. AirFit N30i has a top of the head tube with a nasal mask. AirFit P10 reported to be the most comfortable nasal pillow mask. Resmed [place] FX reported to be the most comfortable nasal mask. Resmed [place] reported to be the most comfortable hybrid mask. AirTouch N20-memory foam nasal mask. Respironics: You might also like to try a nasal mask called a Dreamwear nasal mask or the Dreamwear nasal pillow. Another suggestion is the [facility], it is a minimal contact full face mask. The Avinash Cipro incredible under the nose design makes it the only full face mask that won't cause red marks on the bridge of your nose when compared to other full face masks. The Dreamwear full face mask has a  soft feel, unique in-frame air-flow, and innovative air tube connection at the top of the head for the ultimate in sleep comfort. Comfort Gel Blue. Dreamwear gel pillows. Josh [employer] Kim: Brevida nasal pillow mask and Simplus FFM    The use of a memory foam CPAP pillow supports the head and neck throughout the night.

## 2021-05-10 ENCOUNTER — OFFICE VISIT (OUTPATIENT)
Dept: FAMILY MEDICINE CLINIC | Age: 41
End: 2021-05-10
Payer: COMMERCIAL

## 2021-05-10 VITALS
HEART RATE: 79 BPM | OXYGEN SATURATION: 98 % | DIASTOLIC BLOOD PRESSURE: 66 MMHG | HEIGHT: 60 IN | WEIGHT: 166.8 LBS | TEMPERATURE: 97.4 F | SYSTOLIC BLOOD PRESSURE: 116 MMHG | BODY MASS INDEX: 32.75 KG/M2

## 2021-05-10 DIAGNOSIS — F90.9 ATTENTION DEFICIT HYPERACTIVITY DISORDER (ADHD), UNSPECIFIED ADHD TYPE: Primary | ICD-10-CM

## 2021-05-10 PROCEDURE — G8427 DOCREV CUR MEDS BY ELIG CLIN: HCPCS | Performed by: FAMILY MEDICINE

## 2021-05-10 PROCEDURE — G8417 CALC BMI ABV UP PARAM F/U: HCPCS | Performed by: FAMILY MEDICINE

## 2021-05-10 PROCEDURE — 99213 OFFICE O/P EST LOW 20 MIN: CPT | Performed by: FAMILY MEDICINE

## 2021-05-10 PROCEDURE — 1036F TOBACCO NON-USER: CPT | Performed by: FAMILY MEDICINE

## 2021-05-10 RX ORDER — METHYLPHENIDATE HYDROCHLORIDE 27 MG/1
27 TABLET ORAL DAILY
Qty: 30 TABLET | Refills: 0 | Status: SHIPPED | OUTPATIENT
Start: 2021-05-10 | End: 2021-06-14 | Stop reason: SDUPTHER

## 2021-05-10 ASSESSMENT — ENCOUNTER SYMPTOMS
GASTROINTESTINAL NEGATIVE: 1
RESPIRATORY NEGATIVE: 1
EYES NEGATIVE: 1
ALLERGIC/IMMUNOLOGIC NEGATIVE: 1

## 2021-05-10 NOTE — PROGRESS NOTES
SUBJECTIVE:    Patient ID: April Naomy Dumont is a 45 y. o.female. HPI:   Patient here for follow-up of ADHD. Patient is a 81-year-old white female. She have ADHD. She takes Concerta. Medication is very helpful. Denies medication side effect. Request a refill of medication. She is going to school. Past Medical History:   Diagnosis Date    Anxiety     Attention deficit disorder (ADD) without hyperactivity     IBS (irritable bowel syndrome)     Obstructive sleep apnea     Post partum depression       Current Outpatient Medications   Medication Sig Dispense Refill    methylphenidate (CONCERTA) 27 MG extended release tablet Take 1 tablet by mouth daily for 30 days. 30 tablet 0    rOPINIRole (REQUIP) 0.5 MG tablet 1-2 q hs 60 tablet 5    clonazePAM (KLONOPIN) 0.5 MG tablet Take 1 tablet by mouth 3 times daily as needed for Anxiety for up to 30 days. 60 tablet 0    metFORMIN (GLUCOPHAGE) 500 MG tablet Take 1 tablet by mouth 2 times daily (with meals) (Patient not taking: Reported on 5/10/2021) 60 tablet 1    buPROPion (WELLBUTRIN XL) 300 MG extended release tablet Take 1 tablet by mouth every morning (Patient not taking: Reported on 5/10/2021) 30 tablet 5    hyoscyamine (ANASPAZ;LEVSIN) 125 MCG tablet Take 1 tablet by mouth every 4 hours as needed for Cramping 180 tablet 3     No current facility-administered medications for this visit. Allergies   Allergen Reactions    Betadine [Povidone Iodine]        Review of Systems   Constitutional: Negative. HENT: Negative. Eyes: Negative. Respiratory: Negative. Cardiovascular: Negative. Gastrointestinal: Negative. Endocrine: Negative. Genitourinary: Negative. Musculoskeletal: Negative. Skin: Negative. Allergic/Immunologic: Negative. Neurological: Negative. Hematological: Negative. Psychiatric/Behavioral: Negative. OBJECTIVE:    Physical Exam  Constitutional:       Appearance: Normal appearance.  She is well-developed and well-groomed. HENT:      Head: Normocephalic and atraumatic. Right Ear: Tympanic membrane, ear canal and external ear normal. There is no impacted cerumen. Left Ear: Tympanic membrane, ear canal and external ear normal. There is no impacted cerumen. Nose: Nose normal.      Mouth/Throat:      Lips: Pink. Mouth: Mucous membranes are moist.      Dentition: Normal dentition. Pharynx: Oropharynx is clear. Uvula midline. Eyes:      General: Lids are normal.         Right eye: No discharge. Left eye: No discharge. Extraocular Movements: Extraocular movements intact. Conjunctiva/sclera: Conjunctivae normal.      Right eye: Right conjunctiva is not injected. Left eye: Left conjunctiva is not injected. Pupils: Pupils are equal, round, and reactive to light. Neck:      Musculoskeletal: Full passive range of motion without pain, normal range of motion and neck supple. Thyroid: No thyromegaly. Vascular: No carotid bruit or JVD. Cardiovascular:      Rate and Rhythm: Normal rate and regular rhythm. Pulses: Normal pulses. Carotid pulses are 2+ on the right side and 2+ on the left side. Radial pulses are 2+ on the right side and 2+ on the left side. Heart sounds: Normal heart sounds, S1 normal and S2 normal. No murmur. Pulmonary:      Effort: Pulmonary effort is normal.      Breath sounds: Normal breath sounds. Abdominal:      General: Bowel sounds are normal. There is no distension or abdominal bruit. Palpations: Abdomen is soft. There is no mass. Hernia: No hernia is present. Musculoskeletal: Normal range of motion. Right lower leg: No edema. Left lower leg: No edema. Lymphadenopathy:      Cervical: No cervical adenopathy. Right cervical: No superficial cervical adenopathy. Left cervical: No superficial cervical adenopathy.       Upper Body:      Right upper body: No supraclavicular adenopathy. Left upper body: No supraclavicular adenopathy. Skin:     General: Skin is warm and dry. Coloration: Skin is not pale. Findings: No lesion or rash. Nails: There is no clubbing. Neurological:      Mental Status: She is alert and oriented to person, place, and time. Motor: No weakness or tremor. Coordination: Coordination normal.      Deep Tendon Reflexes: Reflexes are normal and symmetric. Psychiatric:         Attention and Perception: Attention normal.         Mood and Affect: Mood normal.         Speech: Speech normal.         Behavior: Behavior normal. Behavior is cooperative. Thought Content: Thought content normal.         Cognition and Memory: Cognition and memory normal.         Judgment: Judgment normal.        /66 (Site: Left Upper Arm, Position: Sitting, Cuff Size: Medium Adult)   Pulse 79   Temp 97.4 °F (36.3 °C) (Temporal)   Ht 5' (1.524 m)   Wt 166 lb 12.8 oz (75.7 kg)   SpO2 98%   BMI 32.58 kg/m²      ASSESSMENT:     Diagnosis Orders   1. Attention deficit hyperactivity disorder (ADHD), unspecified ADHD typewell-controlled with treatment plan methylphenidate (CONCERTA) 27 MG extended release tablet        PLAN:    1.  Gabriel García. Continue medication. Follow-up in a month.

## 2021-06-14 DIAGNOSIS — F90.9 ATTENTION DEFICIT HYPERACTIVITY DISORDER (ADHD), UNSPECIFIED ADHD TYPE: ICD-10-CM

## 2021-06-14 DIAGNOSIS — F41.9 ANXIETY: ICD-10-CM

## 2021-06-14 DIAGNOSIS — K58.2 IRRITABLE BOWEL SYNDROME WITH BOTH CONSTIPATION AND DIARRHEA: ICD-10-CM

## 2021-06-15 RX ORDER — METHYLPHENIDATE HYDROCHLORIDE 27 MG/1
27 TABLET ORAL DAILY
Qty: 30 TABLET | Refills: 0 | Status: SHIPPED | OUTPATIENT
Start: 2021-06-15 | End: 2021-08-04 | Stop reason: SDUPTHER

## 2021-06-15 RX ORDER — CLONAZEPAM 0.5 MG/1
0.5 TABLET ORAL 3 TIMES DAILY PRN
Qty: 60 TABLET | Refills: 0 | Status: SHIPPED | OUTPATIENT
Start: 2021-06-15 | End: 2021-11-01 | Stop reason: SDUPTHER

## 2021-06-15 RX ORDER — HYOSCYAMINE SULFATE 0.125 MG
125 TABLET ORAL EVERY 4 HOURS PRN
Qty: 180 TABLET | Refills: 3 | Status: SHIPPED | OUTPATIENT
Start: 2021-06-15 | End: 2022-10-12 | Stop reason: ALTCHOICE

## 2021-08-04 DIAGNOSIS — F90.9 ATTENTION DEFICIT HYPERACTIVITY DISORDER (ADHD), UNSPECIFIED ADHD TYPE: ICD-10-CM

## 2021-08-04 RX ORDER — METHYLPHENIDATE HYDROCHLORIDE 27 MG/1
27 TABLET ORAL DAILY
Qty: 30 TABLET | Refills: 0 | Status: SHIPPED | OUTPATIENT
Start: 2021-08-04 | End: 2021-09-17 | Stop reason: SDUPTHER

## 2021-09-01 ENCOUNTER — TELEPHONE (OUTPATIENT)
Dept: NEUROLOGY | Age: 41
End: 2021-09-01

## 2021-09-17 ENCOUNTER — OFFICE VISIT (OUTPATIENT)
Dept: FAMILY MEDICINE CLINIC | Age: 41
End: 2021-09-17
Payer: COMMERCIAL

## 2021-09-17 VITALS
SYSTOLIC BLOOD PRESSURE: 122 MMHG | BODY MASS INDEX: 31.41 KG/M2 | DIASTOLIC BLOOD PRESSURE: 68 MMHG | OXYGEN SATURATION: 99 % | TEMPERATURE: 98.1 F | WEIGHT: 160 LBS | HEIGHT: 60 IN | HEART RATE: 72 BPM

## 2021-09-17 DIAGNOSIS — F90.9 ATTENTION DEFICIT HYPERACTIVITY DISORDER (ADHD), UNSPECIFIED ADHD TYPE: Primary | ICD-10-CM

## 2021-09-17 PROCEDURE — G8427 DOCREV CUR MEDS BY ELIG CLIN: HCPCS | Performed by: FAMILY MEDICINE

## 2021-09-17 PROCEDURE — 99213 OFFICE O/P EST LOW 20 MIN: CPT | Performed by: FAMILY MEDICINE

## 2021-09-17 PROCEDURE — G8417 CALC BMI ABV UP PARAM F/U: HCPCS | Performed by: FAMILY MEDICINE

## 2021-09-17 PROCEDURE — 1036F TOBACCO NON-USER: CPT | Performed by: FAMILY MEDICINE

## 2021-09-17 RX ORDER — METHYLPHENIDATE HYDROCHLORIDE 27 MG/1
27 TABLET ORAL DAILY
Qty: 30 TABLET | Refills: 0 | Status: SHIPPED | OUTPATIENT
Start: 2021-09-17 | End: 2021-11-15 | Stop reason: SDUPTHER

## 2021-09-17 ASSESSMENT — ENCOUNTER SYMPTOMS
GASTROINTESTINAL NEGATIVE: 1
EYES NEGATIVE: 1
ALLERGIC/IMMUNOLOGIC NEGATIVE: 1
RESPIRATORY NEGATIVE: 1

## 2021-09-17 NOTE — PROGRESS NOTES
SUBJECTIVE:    Patient ID: Capri Cooley is a 45 y. o.female. HPI:   Patient here for follow-up of ADHD. Patient is a 20-year-old white female. She have ADHD. She takes Concerta. Concerta has been helpful. Denies medication side effect. She is going to school. Request a refill of medication. Past Medical History:   Diagnosis Date    Anxiety     Attention deficit disorder (ADD) without hyperactivity     IBS (irritable bowel syndrome)     Obstructive sleep apnea     Post partum depression       Current Outpatient Medications   Medication Sig Dispense Refill    methylphenidate (CONCERTA) 27 MG extended release tablet Take 1 tablet by mouth daily for 30 days. 30 tablet 0    hyoscyamine (ANASPAZ;LEVSIN) 125 MCG tablet Take 1 tablet by mouth every 4 hours as needed for Cramping 180 tablet 3    rOPINIRole (REQUIP) 0.5 MG tablet 1-2 q hs 60 tablet 5    clonazePAM (KLONOPIN) 0.5 MG tablet Take 1 tablet by mouth 3 times daily as needed for Anxiety for up to 30 days. 60 tablet 0     No current facility-administered medications for this visit. Allergies   Allergen Reactions    Betadine [Povidone Iodine]        Review of Systems   Constitutional: Negative. HENT: Negative. Eyes: Negative. Respiratory: Negative. Cardiovascular: Negative. Gastrointestinal: Negative. Endocrine: Negative. Genitourinary: Negative. Musculoskeletal: Negative. Skin: Negative. Allergic/Immunologic: Negative. Neurological: Negative. Hematological: Negative. Psychiatric/Behavioral: Negative. OBJECTIVE:    Physical Exam  Constitutional:       Appearance: Normal appearance. She is well-developed and well-groomed. HENT:      Head: Normocephalic and atraumatic. Right Ear: Tympanic membrane, ear canal and external ear normal. There is no impacted cerumen. Left Ear: Tympanic membrane, ear canal and external ear normal. There is no impacted cerumen.       Nose: Nose normal. Mouth/Throat:      Lips: Pink. Mouth: Mucous membranes are moist.      Dentition: Normal dentition. Pharynx: Oropharynx is clear. Uvula midline. Eyes:      General: Lids are normal.         Right eye: No discharge. Left eye: No discharge. Extraocular Movements: Extraocular movements intact. Conjunctiva/sclera: Conjunctivae normal.      Right eye: Right conjunctiva is not injected. Left eye: Left conjunctiva is not injected. Pupils: Pupils are equal, round, and reactive to light. Neck:      Thyroid: No thyromegaly. Vascular: No carotid bruit or JVD. Cardiovascular:      Rate and Rhythm: Normal rate and regular rhythm. Pulses: Normal pulses. Carotid pulses are 2+ on the right side and 2+ on the left side. Radial pulses are 2+ on the right side and 2+ on the left side. Heart sounds: Normal heart sounds, S1 normal and S2 normal. No murmur heard. Pulmonary:      Effort: Pulmonary effort is normal.      Breath sounds: Normal breath sounds. Abdominal:      General: Bowel sounds are normal. There is no distension or abdominal bruit. Palpations: Abdomen is soft. There is no mass. Hernia: No hernia is present. Musculoskeletal:         General: Normal range of motion. Cervical back: Full passive range of motion without pain, normal range of motion and neck supple. Right lower leg: No edema. Left lower leg: No edema. Lymphadenopathy:      Cervical: No cervical adenopathy. Right cervical: No superficial cervical adenopathy. Left cervical: No superficial cervical adenopathy. Upper Body:      Right upper body: No supraclavicular adenopathy. Left upper body: No supraclavicular adenopathy. Skin:     General: Skin is warm and dry. Coloration: Skin is not pale. Findings: No lesion or rash. Nails: There is no clubbing.    Neurological:      Mental Status: She is alert and oriented to person, place, and time. Motor: No weakness or tremor. Coordination: Coordination normal.      Deep Tendon Reflexes: Reflexes are normal and symmetric. Psychiatric:         Attention and Perception: Attention normal.         Mood and Affect: Mood normal.         Speech: Speech normal.         Behavior: Behavior normal. Behavior is cooperative. Thought Content: Thought content normal.         Cognition and Memory: Cognition and memory normal.         Judgment: Judgment normal.        /68 (Site: Right Upper Arm, Position: Sitting, Cuff Size: Medium Adult)   Pulse 72   Temp 98.1 °F (36.7 °C) (Temporal)   Ht 5' (1.524 m)   Wt 160 lb (72.6 kg)   SpO2 99%   BMI 31.25 kg/m²      ASSESSMENT:     Diagnosis Orders   1. Attention deficit hyperactivity disorder (ADHD), unspecified ADHD typestable methylphenidate (CONCERTA) 27 MG extended release tablet        PLAN:    1.  Rogelio Abad.   Continue medication follow-up 3 months

## 2021-10-07 ENCOUNTER — PATIENT MESSAGE (OUTPATIENT)
Dept: FAMILY MEDICINE CLINIC | Age: 41
End: 2021-10-07

## 2021-10-07 DIAGNOSIS — E74.39 GLUCOSE INTOLERANCE: Primary | ICD-10-CM

## 2021-10-11 NOTE — TELEPHONE ENCOUNTER
From: April Strait  Sent: 10/8/2021 2:50 PM CDT  To: [address] [place] [address] Clinical Staff  Subject: RE: Non-Urgent Medical Question    [id number] Maribel Aguilar Thank you    ----- Message -----  From: Raquel Jovita  Sent: 10/8/21, 11:05 AM  To: April Strait  Subject: RE: Non-Urgent Medical Question    Dr. Jurgen Rosas is out this week for Fall Break. I will ask him and let you know next week if that is fine with him. I'm sure it will be, I just have to get his approval.      ----- Message -----   From:April Strait   Sent:10/7/2021 3:11 PM EDT   To:Howard Rosas MD   Subject:Non-Urgent Medical Question    I wanted to see an endocrinologist since my A1C has increased. Can Dr. Jurgen Rosas send a referral with my lab work (including the blood work from Dr. Kirsten Luna that I gave to the nurse at my last appt) to Dr. Klever Chaudhary in [place], [place]?

## 2021-11-01 DIAGNOSIS — F41.9 ANXIETY: ICD-10-CM

## 2021-11-02 RX ORDER — CLONAZEPAM 0.5 MG/1
0.5 TABLET ORAL 3 TIMES DAILY PRN
Qty: 60 TABLET | Refills: 0 | Status: SHIPPED | OUTPATIENT
Start: 2021-11-02 | End: 2022-01-04 | Stop reason: SDUPTHER

## 2021-11-15 DIAGNOSIS — F90.9 ATTENTION DEFICIT HYPERACTIVITY DISORDER (ADHD), UNSPECIFIED ADHD TYPE: ICD-10-CM

## 2021-11-15 RX ORDER — METHYLPHENIDATE HYDROCHLORIDE 27 MG/1
27 TABLET ORAL DAILY
Qty: 30 TABLET | Refills: 0 | Status: SHIPPED | OUTPATIENT
Start: 2021-11-15 | End: 2022-01-04 | Stop reason: SDUPTHER

## 2022-01-04 DIAGNOSIS — F41.9 ANXIETY: ICD-10-CM

## 2022-01-04 DIAGNOSIS — F90.9 ATTENTION DEFICIT HYPERACTIVITY DISORDER (ADHD), UNSPECIFIED ADHD TYPE: ICD-10-CM

## 2022-01-04 RX ORDER — CLONAZEPAM 0.5 MG/1
0.5 TABLET ORAL 3 TIMES DAILY PRN
Qty: 60 TABLET | Refills: 0 | Status: SHIPPED | OUTPATIENT
Start: 2022-01-04 | End: 2022-03-21 | Stop reason: SDUPTHER

## 2022-01-04 RX ORDER — METHYLPHENIDATE HYDROCHLORIDE 27 MG/1
27 TABLET ORAL DAILY
Qty: 30 TABLET | Refills: 0 | Status: SHIPPED | OUTPATIENT
Start: 2022-01-04 | End: 2022-05-26 | Stop reason: SDUPTHER

## 2022-01-27 PROBLEM — R73.03 PREDIABETES: Status: ACTIVE | Noted: 2022-01-27

## 2022-01-27 NOTE — TELEPHONE ENCOUNTER
Patient is being prescribed Trulicity 4.5 mg once weekly. Dr. Hayde Rasmussen needs to take over care from Dr. Danyel Lanier and she needs a PA on it. Venous insufficiency of leg  Chronic.  Continue prior wound care orders (change dressings every Monday and Thursday--> last changed 12/29/170

## 2022-02-02 ENCOUNTER — OFFICE VISIT (OUTPATIENT)
Dept: FAMILY MEDICINE CLINIC | Age: 42
End: 2022-02-02
Payer: COMMERCIAL

## 2022-02-02 VITALS — BODY MASS INDEX: 28.71 KG/M2 | TEMPERATURE: 97.6 F | OXYGEN SATURATION: 99 % | HEART RATE: 109 BPM | WEIGHT: 147 LBS

## 2022-02-02 DIAGNOSIS — J01.00 ACUTE NON-RECURRENT MAXILLARY SINUSITIS: Primary | ICD-10-CM

## 2022-02-02 DIAGNOSIS — F33.1 MODERATE EPISODE OF RECURRENT MAJOR DEPRESSIVE DISORDER (HCC): ICD-10-CM

## 2022-02-02 DIAGNOSIS — J34.89 POSTERIOR RHINORRHEA: ICD-10-CM

## 2022-02-02 DIAGNOSIS — R73.03 PREDIABETES: ICD-10-CM

## 2022-02-02 PROCEDURE — 99214 OFFICE O/P EST MOD 30 MIN: CPT | Performed by: FAMILY MEDICINE

## 2022-02-02 PROCEDURE — G8484 FLU IMMUNIZE NO ADMIN: HCPCS | Performed by: FAMILY MEDICINE

## 2022-02-02 PROCEDURE — G8427 DOCREV CUR MEDS BY ELIG CLIN: HCPCS | Performed by: FAMILY MEDICINE

## 2022-02-02 PROCEDURE — G8417 CALC BMI ABV UP PARAM F/U: HCPCS | Performed by: FAMILY MEDICINE

## 2022-02-02 PROCEDURE — 1036F TOBACCO NON-USER: CPT | Performed by: FAMILY MEDICINE

## 2022-02-02 RX ORDER — OMEPRAZOLE 20 MG/1
20 CAPSULE, DELAYED RELEASE ORAL DAILY
COMMUNITY

## 2022-02-02 RX ORDER — CEFDINIR 300 MG/1
300 CAPSULE ORAL 2 TIMES DAILY
Qty: 20 CAPSULE | Refills: 0 | Status: SHIPPED | OUTPATIENT
Start: 2022-02-02 | End: 2022-02-12

## 2022-02-02 RX ORDER — METHYLPREDNISOLONE 4 MG/1
TABLET ORAL
Qty: 1 KIT | Refills: 0 | Status: SHIPPED | OUTPATIENT
Start: 2022-02-02 | End: 2022-02-08

## 2022-02-02 SDOH — ECONOMIC STABILITY: FOOD INSECURITY: WITHIN THE PAST 12 MONTHS, THE FOOD YOU BOUGHT JUST DIDN'T LAST AND YOU DIDN'T HAVE MONEY TO GET MORE.: NEVER TRUE

## 2022-02-02 SDOH — ECONOMIC STABILITY: FOOD INSECURITY: WITHIN THE PAST 12 MONTHS, YOU WORRIED THAT YOUR FOOD WOULD RUN OUT BEFORE YOU GOT MONEY TO BUY MORE.: NEVER TRUE

## 2022-02-02 ASSESSMENT — PATIENT HEALTH QUESTIONNAIRE - PHQ9
8. MOVING OR SPEAKING SO SLOWLY THAT OTHER PEOPLE COULD HAVE NOTICED. OR THE OPPOSITE, BEING SO FIGETY OR RESTLESS THAT YOU HAVE BEEN MOVING AROUND A LOT MORE THAN USUAL: 0
7. TROUBLE CONCENTRATING ON THINGS, SUCH AS READING THE NEWSPAPER OR WATCHING TELEVISION: 0
3. TROUBLE FALLING OR STAYING ASLEEP: 3
10. IF YOU CHECKED OFF ANY PROBLEMS, HOW DIFFICULT HAVE THESE PROBLEMS MADE IT FOR YOU TO DO YOUR WORK, TAKE CARE OF THINGS AT HOME, OR GET ALONG WITH OTHER PEOPLE: 1
SUM OF ALL RESPONSES TO PHQ QUESTIONS 1-9: 5
6. FEELING BAD ABOUT YOURSELF - OR THAT YOU ARE A FAILURE OR HAVE LET YOURSELF OR YOUR FAMILY DOWN: 0
SUM OF ALL RESPONSES TO PHQ QUESTIONS 1-9: 5
SUM OF ALL RESPONSES TO PHQ9 QUESTIONS 1 & 2: 2
SUM OF ALL RESPONSES TO PHQ QUESTIONS 1-9: 5
SUM OF ALL RESPONSES TO PHQ QUESTIONS 1-9: 5
5. POOR APPETITE OR OVEREATING: 0
9. THOUGHTS THAT YOU WOULD BE BETTER OFF DEAD, OR OF HURTING YOURSELF: 0
1. LITTLE INTEREST OR PLEASURE IN DOING THINGS: 0
4. FEELING TIRED OR HAVING LITTLE ENERGY: 0
2. FEELING DOWN, DEPRESSED OR HOPELESS: 2

## 2022-02-02 ASSESSMENT — SOCIAL DETERMINANTS OF HEALTH (SDOH): HOW HARD IS IT FOR YOU TO PAY FOR THE VERY BASICS LIKE FOOD, HOUSING, MEDICAL CARE, AND HEATING?: NOT HARD AT ALL

## 2022-02-02 NOTE — PROGRESS NOTES
Chad DARLING [address]  Dept: [phone]  Dept Fax: [phone]    Visit type: Established patient    Reason for Visit: Cough (yellow mucus production today), Congestion, Headache (started last week), and Fever (100.7 this AM)         Assessment and Plan       1. Acute non-recurrent maxillary sinusitis  -     cefdinir (OMNICEF) 300 MG capsule; Take 1 capsule by mouth 2 times daily for 10 days, Disp-20 capsule, R-0Normal  -     methylPREDNISolone (MEDROL DOSEPACK) 4 MG tablet; Take by mouth., Disp-1 kit, R-0Normal  2. Posterior rhinorrhea  -     methylPREDNISolone (MEDROL DOSEPACK) 4 MG tablet; Take by mouth., Disp-1 kit, R-0Normal  3. Moderate episode of recurrent major depressive disorder (HCC)  -     sertraline (ZOLOFT) 50 MG tablet; Take 1 tablet by mouth daily, Disp-30 tablet, R-2Normal  4. Prediabetes  Discussed diagnosis, expected course, and proper use of medication, including OTC medications if prescription is too expensive or insurance does not cover. Discussed medications for prediabetes and medications for weight loss. Discussed that with her BMI being overweight and her not being diabetic that the available medications are limited and Metformin is the only real approved medication in the setting without other comorbidities or problems that would require management and for that reason insurance is going to be difficult to pay for anything else. Briefly discussed Jvwill Darbys however with her not eating being obese discussed that it is quite expensive and she technically would not qualify for it either. She voiced understanding. Discussed her mood and medication options and through shared decision making termination was to pursue a trial of Zoloft. Discussed proper use of medication and expected course. Discussed possible need for medication adjustments moving forward. Discussed signs and symptoms requiring medical attention.   All questions were answered and patient voiced understanding and agreement with plan as discussed. Return if symptoms worsen or fail to improve, for next scheduled follow up with PCP. Subjective       HPI   Patient reports that last week she started to have issues with sinus pressure and drainage. She states that she thought it was just allergies but she has had more drainage with the pressure and has been having purulent drainage. She did have a fever 100.7 and took ibuprofen with potentially some benefit to her symptoms but did improve her fever. She states that she did end up getting tested for Covid and was negative. She denies any specific aggravating or relieving factors for symptoms. She denies any specific known sick contacts. She does have a history of prediabetes and is currently on Metformin but was previously seeing an endocrinologist and was placed on Trulicity with improvement of her hemoglobin A1c and is no longer within the prediabetic range. She states that she was able to get Trulicity through a discount program for a year but that has run out and she is no longer able to afford the Trulicity and is interested in seeing if there is any other options other than Metformin for management of her blood sugar she has an improvement with Trulicity and is actually experienced some weight loss as well. She does have a history of depression and reports that she was placed on Wellbutrin but did not really work well for her. She states that she was going to therapy and that did well for so she was not really having any problems or issues and just continue to do well without medicine but more recently she has been having more problems with her mood and feels like it is time to get back on something in efforts to improve her symptoms. She states that she was previously on Celexa as well but had a lot of side effects from the medicine. Review of Systems   Constitutional: Positive for fever.  Negative for activity change and appetite change. HENT: Positive for congestion, rhinorrhea, sinus pressure and sinus pain. Negative for sore throat. Eyes: Negative for pain and discharge. Respiratory: Positive for cough. Negative for shortness of breath. Cardiovascular: Negative for chest pain and palpitations. Gastrointestinal: Negative for abdominal pain, constipation, diarrhea, nausea and vomiting. Endocrine: Negative for cold intolerance and heat intolerance. Genitourinary: Negative for dysuria and hematuria. Musculoskeletal: Negative for gait problem and neck pain. Skin: Negative for rash and wound. Psychiatric/Behavioral: Positive for dysphoric mood. Allergies   Allergen Reactions    Betadine [Povidone Iodine]        Outpatient Medications Prior to Visit   Medication Sig Dispense Refill    omeprazole (PRILOSEC) 20 MG delayed release capsule Take 20 mg by mouth daily      Dulaglutide 4.5 MG/0.5ML SOPN Inject 4.5 mg into the skin once a week 4 pen 5    clonazePAM (KLONOPIN) 0.5 MG tablet Take 1 tablet by mouth 3 times daily as needed for Anxiety for up to 30 days. 60 tablet 0    methylphenidate (CONCERTA) 27 MG extended release tablet Take 1 tablet by mouth daily for 30 days. 30 tablet 0    hyoscyamine (ANASPAZ;LEVSIN) 125 MCG tablet Take 1 tablet by mouth every 4 hours as needed for Cramping 180 tablet 3    rOPINIRole (REQUIP) 0.5 MG tablet 1-2 q hs 60 tablet 5     No facility-administered medications prior to visit.         Past Medical History:   Diagnosis Date    Anxiety     Attention deficit disorder (ADD) without hyperactivity     IBS (irritable bowel syndrome)     Obstructive sleep apnea     Post partum depression         Social History     Tobacco Use    Smoking status: Former Smoker     Packs/day: 0.50     Years: 5.00     Pack years: 2.50     Quit date: 2009     Years since quittin.5    Smokeless tobacco: Never Used   Substance Use Topics    Alcohol use: Not on file        Past Surgical History:   Procedure Laterality Date    BREAST ENHANCEMENT SURGERY       SECTION  9245,6693    CHOLECYSTECTOMY         Family History   Problem Relation Age of Onset    Diabetes Mother     High Blood Pressure Father     Other Paternal Grandmother         over produced platelets    Colon Cancer Paternal Grandfather     Cancer Paternal Grandfather         bladder       Objective       Pulse 109   Temp 97.6 °F (36.4 °C)   Wt 147 lb (66.7 kg)   SpO2 99%   BMI 28.71 kg/m²   Physical Exam  Vitals and nursing note reviewed. Constitutional:       Appearance: She is well-developed. HENT:      Head: Normocephalic and atraumatic. Comments: Maxillary sinus tenderness     Right Ear: Hearing, tympanic membrane, ear canal and external ear normal.      Left Ear: Hearing, tympanic membrane, ear canal and external ear normal.      Nose: Congestion and rhinorrhea present. Mouth/Throat:      Mouth: Mucous membranes are moist.      Pharynx: No oropharyngeal exudate or posterior oropharyngeal erythema. Eyes:      General: No scleral icterus. Right eye: No discharge. Left eye: No discharge. Conjunctiva/sclera: Conjunctivae normal.      Pupils: Pupils are equal, round, and reactive to light. Neck:      Trachea: No tracheal deviation. Cardiovascular:      Rate and Rhythm: Normal rate and regular rhythm. Heart sounds: Normal heart sounds. No murmur heard. No friction rub. No gallop. Pulmonary:      Effort: Pulmonary effort is normal. No respiratory distress. Breath sounds: Normal breath sounds. No wheezing or rales. Abdominal:      General: Bowel sounds are normal. There is no distension. Palpations: Abdomen is soft. Tenderness: There is no abdominal tenderness. Musculoskeletal:         General: No deformity ( no gross deformities of upper or lower extremities bilaterally). Normal range of motion.       Cervical back: Normal range of motion and neck supple. Right lower leg: No edema. Left lower leg: No edema. Lymphadenopathy:      Cervical: No cervical adenopathy. Skin:     General: Skin is warm and dry. Findings: No erythema or rash. Neurological:      Mental Status: She is alert and oriented to person, place, and time. Cranial Nerves: No cranial nerve deficit. Motor: No abnormal muscle tone. Psychiatric:         Behavior: Behavior normal.         Thought Content:  Thought content normal.           Data Reviewed and Summarized       Labs:     Imaging/Testing:        Anthony Tiwari MD

## 2022-02-04 ASSESSMENT — ENCOUNTER SYMPTOMS
EYE DISCHARGE: 0
SORE THROAT: 0
ABDOMINAL PAIN: 0
SHORTNESS OF BREATH: 0
SINUS PAIN: 1
NAUSEA: 0
COUGH: 1
SINUS PRESSURE: 1
RHINORRHEA: 1
EYE PAIN: 0
DIARRHEA: 0
VOMITING: 0
CONSTIPATION: 0

## 2022-02-04 NOTE — PATIENT INSTRUCTIONS
Patient Education        Sinusitis: Care Instructions  Your Care Instructions     Sinusitis is an infection of the lining of the sinus cavities in your head. Sinusitis often follows a cold. It causes pain and pressure in your head and face. In most cases, sinusitis gets better on its own in 1 to 2 weeks. But some mild symptoms may last for several weeks. Sometimes antibiotics are needed. Follow-up care is a key part of your treatment and safety. Be sure to make and go to all appointments, and call your doctor if you are having problems. It's also a good idea to know your test results and keep a list of the medicines you take. How can you care for yourself at home? · Take an over-the-counter pain medicine, such as acetaminophen (Tylenol), ibuprofen (Advil, Motrin), or naproxen (Aleve). Read and follow all instructions on the label. · If the doctor prescribed antibiotics, take them as directed. Do not stop taking them just because you feel better. You need to take the full course of antibiotics. · Be careful when taking over-the-counter cold or flu medicines and Tylenol at the same time. Many of these medicines have acetaminophen, which is Tylenol. Read the labels to make sure that you are not taking more than the recommended dose. Too much acetaminophen (Tylenol) can be harmful. · Breathe warm, moist air from a steamy shower, a hot bath, or a sink filled with hot water. Avoid cold, dry air. Using a humidifier in your home may help. Follow the directions for cleaning the machine. · Use saline (saltwater) nasal washes. This can help keep your nasal passages open and wash out mucus and bacteria. You can buy saline nose drops at a grocery store or drugstore. Or you can make your own at home by adding 1 teaspoon of salt and 1 teaspoon of baking soda to 2 cups of distilled water. If you make your own, fill a bulb syringe with the solution, insert the tip into your nostril, and squeeze gently.  [employer] your nose.  · Put a hot, wet towel or a warm gel pack on your face 3 or 4 times a day for 5 to 10 minutes each time. · Try a decongestant nasal spray like oxymetazoline (Afrin). Do not use it for more than 3 days in a row. Using it for more than 3 days can make your congestion worse. When should you call for help? Call your doctor now or seek immediate medical care if:    · You have new or worse swelling or redness in your face or around your eyes.     · You have a new or higher fever. Watch closely for changes in your health, and be sure to contact your doctor if:    · You have new or worse facial pain.     · The mucus from your nose becomes thicker (like pus) or has new blood in it.     · You are not getting better as expected. Where can you learn more? Go to [email or web address]://[email or web address] org and sign in to your DivX account. Enter K569 in the MyBuys box to learn more about \"Sinusitis: Care Instructions. \"     If you do not have an account, please click on the \"Sign Up Now\" link. Current as of: September 8, 2021               Content Version: 13.1  © 5556-2310 [employer]. Care instructions adapted under license by [facility] [place] ([facility]). If you have questions about a medical condition or this instruction, always ask your healthcare professional. [place] [address] any warranty or liability for your use of this information.

## 2022-02-16 ENCOUNTER — OFFICE VISIT (OUTPATIENT)
Dept: FAMILY MEDICINE CLINIC | Age: 42
End: 2022-02-16
Payer: COMMERCIAL

## 2022-02-16 VITALS
BODY MASS INDEX: 28.71 KG/M2 | SYSTOLIC BLOOD PRESSURE: 126 MMHG | HEIGHT: 60 IN | DIASTOLIC BLOOD PRESSURE: 72 MMHG | HEART RATE: 79 BPM | TEMPERATURE: 98.2 F | OXYGEN SATURATION: 99 %

## 2022-02-16 DIAGNOSIS — F90.9 ATTENTION DEFICIT HYPERACTIVITY DISORDER (ADHD), UNSPECIFIED ADHD TYPE: ICD-10-CM

## 2022-02-16 PROCEDURE — 99213 OFFICE O/P EST LOW 20 MIN: CPT | Performed by: FAMILY MEDICINE

## 2022-02-16 PROCEDURE — G8417 CALC BMI ABV UP PARAM F/U: HCPCS | Performed by: FAMILY MEDICINE

## 2022-02-16 PROCEDURE — G8484 FLU IMMUNIZE NO ADMIN: HCPCS | Performed by: FAMILY MEDICINE

## 2022-02-16 PROCEDURE — 1036F TOBACCO NON-USER: CPT | Performed by: FAMILY MEDICINE

## 2022-02-16 PROCEDURE — G8427 DOCREV CUR MEDS BY ELIG CLIN: HCPCS | Performed by: FAMILY MEDICINE

## 2022-02-16 RX ORDER — METHYLPHENIDATE HYDROCHLORIDE 27 MG/1
27 TABLET ORAL DAILY
Qty: 30 TABLET | Refills: 0 | Status: SHIPPED | OUTPATIENT
Start: 2022-02-16 | End: 2022-03-21 | Stop reason: SDUPTHER

## 2022-02-16 NOTE — PROGRESS NOTES
SUBJECTIVE:    Patient ID: Capri Baldwin is a 37 y. o.female. HPI:   Patient here for follow-up of ADHD  Patient is a 43-year-old white female. She works in the hospital.  She have ADHD. Take Concerta. Medication very helpful. Denies medication side effect. She is able to stay on task. Request a refill of medication. Past Medical History:   Diagnosis Date    Anxiety     Attention deficit disorder (ADD) without hyperactivity     IBS (irritable bowel syndrome)     Obstructive sleep apnea     Post partum depression       Current Outpatient Medications   Medication Sig Dispense Refill    methylphenidate (CONCERTA) 27 MG extended release tablet Take 1 tablet by mouth daily for 30 days. 30 tablet 0    omeprazole (PRILOSEC) 20 MG delayed release capsule Take 20 mg by mouth daily      sertraline (ZOLOFT) 50 MG tablet Take 1 tablet by mouth daily 30 tablet 2    Dulaglutide 4.5 MG/0.5ML SOPN Inject 4.5 mg into the skin once a week 4 pen 5    hyoscyamine (ANASPAZ;LEVSIN) 125 MCG tablet Take 1 tablet by mouth every 4 hours as needed for Cramping 180 tablet 3    rOPINIRole (REQUIP) 0.5 MG tablet 1-2 q hs 60 tablet 5    clonazePAM (KLONOPIN) 0.5 MG tablet Take 1 tablet by mouth 3 times daily as needed for Anxiety for up to 30 days. 60 tablet 0    methylphenidate (CONCERTA) 27 MG extended release tablet Take 1 tablet by mouth daily for 30 days. 30 tablet 0     No current facility-administered medications for this visit. Allergies   Allergen Reactions    Betadine [Povidone Iodine]        Review of Systems   Constitutional: Negative. HENT: Negative. Eyes: Negative. Respiratory: Negative. Cardiovascular: Negative. Gastrointestinal: Negative. Endocrine: Negative. Genitourinary: Negative. Musculoskeletal: Negative. Skin: Negative. Allergic/Immunologic: Negative. Neurological: Negative. Hematological: Negative. Psychiatric/Behavioral: Negative. OBJECTIVE:    Physical Exam  Constitutional:       Appearance: Normal appearance. She is well-developed and well-groomed. HENT:      Head: Normocephalic and atraumatic. Right Ear: Tympanic membrane, ear canal and external ear normal. There is no impacted cerumen. Left Ear: Tympanic membrane, ear canal and external ear normal. There is no impacted cerumen. Nose: Nose normal.      Mouth/Throat:      Lips: Pink. Mouth: Mucous membranes are moist.      Dentition: Normal dentition. Pharynx: Oropharynx is clear. Uvula midline. Eyes:      General: Lids are normal.         Right eye: No discharge. Left eye: No discharge. Extraocular Movements: Extraocular movements intact. Conjunctiva/sclera: Conjunctivae normal.      Right eye: Right conjunctiva is not injected. Left eye: Left conjunctiva is not injected. Pupils: Pupils are equal, round, and reactive to light. Neck:      Thyroid: No thyromegaly. Vascular: No carotid bruit or JVD. Cardiovascular:      Rate and Rhythm: Normal rate and regular rhythm. Pulses: Normal pulses. Carotid pulses are 2+ on the right side and 2+ on the left side. Radial pulses are 2+ on the right side and 2+ on the left side. Heart sounds: Normal heart sounds, S1 normal and S2 normal. No murmur heard. Pulmonary:      Effort: Pulmonary effort is normal.      Breath sounds: Normal breath sounds. Chest:   Breasts:      Right: No supraclavicular adenopathy. Left: No supraclavicular adenopathy. Abdominal:      General: Bowel sounds are normal. There is no distension or abdominal bruit. Palpations: Abdomen is soft. There is no mass. Hernia: No hernia is present. Musculoskeletal:         General: Normal range of motion. Cervical back: Full passive range of motion without pain, normal range of motion and neck supple. Right lower leg: No edema. Left lower leg: No edema. Lymphadenopathy:      Cervical: No cervical adenopathy. Right cervical: No superficial cervical adenopathy. Left cervical: No superficial cervical adenopathy. Upper Body:      Right upper body: No supraclavicular adenopathy. Left upper body: No supraclavicular adenopathy. Skin:     General: Skin is warm and dry. Coloration: Skin is not pale. Findings: No lesion or rash. Nails: There is no clubbing. Neurological:      Mental Status: She is alert and oriented to person, place, and time. Motor: No weakness or tremor. Coordination: Coordination normal.      Deep Tendon Reflexes: Reflexes are normal and symmetric. Psychiatric:         Attention and Perception: Attention normal.         Mood and Affect: Mood normal.         Speech: Speech normal.         Behavior: Behavior normal. Behavior is cooperative. Thought Content: Thought content normal.         Cognition and Memory: Cognition and memory normal.         Judgment: Judgment normal.        /72 (Site: Left Upper Arm, Position: Sitting, Cuff Size: Medium Adult)   Pulse 79   Temp 98.2 °F (36.8 °C) (Temporal)   Ht 5' (1.524 m)   SpO2 99%   BMI 28.71 kg/m²      ASSESSMENT:     Diagnosis Orders   1.  Attention deficit hyperactivity disorder (ADHD), unspecified ADHD type-stable methylphenidate (CONCERTA) 27 MG extended release tablet        PLAN:    1.  [facility] continue medications follow-up 3 months

## 2022-03-21 DIAGNOSIS — F41.9 ANXIETY: ICD-10-CM

## 2022-03-21 DIAGNOSIS — F90.9 ATTENTION DEFICIT HYPERACTIVITY DISORDER (ADHD), UNSPECIFIED ADHD TYPE: ICD-10-CM

## 2022-03-22 RX ORDER — METHYLPHENIDATE HYDROCHLORIDE 27 MG/1
27 TABLET ORAL DAILY
Qty: 30 TABLET | Refills: 0 | Status: SHIPPED | OUTPATIENT
Start: 2022-03-22 | End: 2022-04-22 | Stop reason: SDUPTHER

## 2022-03-22 RX ORDER — CLONAZEPAM 0.5 MG/1
0.5 TABLET ORAL 3 TIMES DAILY PRN
Qty: 60 TABLET | Refills: 0 | Status: SHIPPED | OUTPATIENT
Start: 2022-03-22 | End: 2022-05-12 | Stop reason: SDUPTHER

## 2022-04-21 DIAGNOSIS — F33.1 MODERATE EPISODE OF RECURRENT MAJOR DEPRESSIVE DISORDER (HCC): ICD-10-CM

## 2022-04-22 DIAGNOSIS — F90.9 ATTENTION DEFICIT HYPERACTIVITY DISORDER (ADHD), UNSPECIFIED ADHD TYPE: ICD-10-CM

## 2022-04-22 RX ORDER — METHYLPHENIDATE HYDROCHLORIDE 27 MG/1
27 TABLET ORAL DAILY
Qty: 30 TABLET | Refills: 0 | Status: SHIPPED | OUTPATIENT
Start: 2022-04-22 | End: 2022-05-26 | Stop reason: CLARIF

## 2022-05-12 ENCOUNTER — PATIENT MESSAGE (OUTPATIENT)
Dept: FAMILY MEDICINE CLINIC | Age: 42
End: 2022-05-12

## 2022-05-12 DIAGNOSIS — F41.9 ANXIETY: ICD-10-CM

## 2022-05-12 RX ORDER — CLONAZEPAM 0.5 MG/1
0.5 TABLET ORAL 3 TIMES DAILY PRN
Qty: 30 TABLET | Refills: 0 | Status: SHIPPED | OUTPATIENT
Start: 2022-05-12 | End: 2022-05-26 | Stop reason: SDUPTHER

## 2022-05-12 NOTE — TELEPHONE ENCOUNTER
From: April Strait  To: Dr. He Divers: 5/12/2022 8:49 AM CDT  Subject: Klonopin    I need a refill on my Klonopin. However, I have seen 2 ENTs (Dr. Rhiannon Wahl & Dr. Charmaine Spear) for worsening tinnitus. They both recommended taking my Klonopin scheduled. My prescription is 3 times a day as needed but Dr. Yobani Alonso only calls in enough for 20 days at 3 times a day. The ENTs asked if Dr. Yobani Alonso would give me enough to take it 3 times a day. Please let me know.

## 2022-05-26 ENCOUNTER — OFFICE VISIT (OUTPATIENT)
Dept: FAMILY MEDICINE CLINIC | Age: 42
End: 2022-05-26
Payer: COMMERCIAL

## 2022-05-26 VITALS
WEIGHT: 157 LBS | TEMPERATURE: 98 F | DIASTOLIC BLOOD PRESSURE: 74 MMHG | BODY MASS INDEX: 30.82 KG/M2 | OXYGEN SATURATION: 98 % | SYSTOLIC BLOOD PRESSURE: 126 MMHG | HEIGHT: 60 IN | HEART RATE: 88 BPM

## 2022-05-26 DIAGNOSIS — H93.13 TINNITUS OF BOTH EARS: ICD-10-CM

## 2022-05-26 DIAGNOSIS — G25.81 RESTLESS LEG SYNDROME: ICD-10-CM

## 2022-05-26 DIAGNOSIS — F41.9 ANXIETY: ICD-10-CM

## 2022-05-26 DIAGNOSIS — F90.9 ATTENTION DEFICIT HYPERACTIVITY DISORDER (ADHD), UNSPECIFIED ADHD TYPE: Primary | ICD-10-CM

## 2022-05-26 PROCEDURE — 99214 OFFICE O/P EST MOD 30 MIN: CPT | Performed by: FAMILY MEDICINE

## 2022-05-26 PROCEDURE — 1036F TOBACCO NON-USER: CPT | Performed by: FAMILY MEDICINE

## 2022-05-26 PROCEDURE — G8417 CALC BMI ABV UP PARAM F/U: HCPCS | Performed by: FAMILY MEDICINE

## 2022-05-26 PROCEDURE — G8427 DOCREV CUR MEDS BY ELIG CLIN: HCPCS | Performed by: FAMILY MEDICINE

## 2022-05-26 RX ORDER — CLONAZEPAM 0.5 MG/1
0.5 TABLET ORAL 3 TIMES DAILY PRN
Qty: 90 TABLET | Refills: 2 | Status: SHIPPED | OUTPATIENT
Start: 2022-05-26 | End: 2022-10-12 | Stop reason: SDUPTHER

## 2022-05-26 RX ORDER — ROPINIROLE 0.5 MG/1
1 TABLET, FILM COATED ORAL NIGHTLY
Qty: 60 TABLET | Refills: 5 | Status: SHIPPED | OUTPATIENT
Start: 2022-05-26

## 2022-05-26 RX ORDER — METHYLPHENIDATE HYDROCHLORIDE 27 MG/1
27 TABLET ORAL DAILY
Qty: 30 TABLET | Refills: 0 | Status: SHIPPED | OUTPATIENT
Start: 2022-05-26 | End: 2022-08-05 | Stop reason: SDUPTHER

## 2022-05-26 NOTE — PROGRESS NOTES
SUBJECTIVE:    Patient ID: Capri Abarca is a 37 y. o.female. HPI:   Here for follow-up of multiple medical problems. Patient is a 81-year-old white female. She have ADHD. She takes Concerta. Medication is very helpful. She is able to stay functional.  She also have anxiety and takes Klonopin as needed. She have tinnitus. She has been seen by 2 different ENTs. They recommend more aggressive therapy for her anxiety. Both of them recommended Klonopin 3 times a day. Tinnitus is interfering with her quality of life. She also have restless leg syndrome. She takes Requip. She was prescribed by neurology. Request for me to start prescribing the medication. Medication have been very effective. Past Medical History:   Diagnosis Date    Anxiety     Attention deficit disorder (ADD) without hyperactivity     IBS (irritable bowel syndrome)     Obstructive sleep apnea     Post partum depression       Current Outpatient Medications   Medication Sig Dispense Refill    clonazePAM (KLONOPIN) 0.5 MG tablet Take 1 tablet by mouth 3 times daily as needed for Anxiety for up to 10 days. 90 tablet 2    methylphenidate (CONCERTA) 27 MG extended release tablet Take 1 tablet by mouth daily for 30 days. 30 tablet 0    rOPINIRole (REQUIP) 0.5 MG tablet Take 2 tablets by mouth at bedtime 1-2 q hs 60 tablet 5    sertraline (ZOLOFT) 50 MG tablet TAKE ONE TABLET BY MOUTH DAILY. 30 tablet 2    omeprazole (PRILOSEC) 20 MG delayed release capsule Take 20 mg by mouth daily      hyoscyamine (ANASPAZ;LEVSIN) 125 MCG tablet Take 1 tablet by mouth every 4 hours as needed for Cramping 180 tablet 3     No current facility-administered medications for this visit. Allergies   Allergen Reactions    Betadine [Povidone Iodine]        Review of Systems   Constitutional: Negative. HENT: Negative. Eyes: Negative. Respiratory: Negative. Cardiovascular: Negative. Gastrointestinal: Negative. Endocrine: Negative. Genitourinary: Negative. Musculoskeletal: Negative. Skin: Negative. Allergic/Immunologic: Negative. Neurological: Negative. Hematological: Negative. Psychiatric/Behavioral: Negative. OBJECTIVE:    Physical Exam  Constitutional:       Appearance: Normal appearance. She is well-developed and well-groomed. HENT:      Head: Normocephalic and atraumatic. Right Ear: Tympanic membrane, ear canal and external ear normal. There is no impacted cerumen. Left Ear: Tympanic membrane, ear canal and external ear normal. There is no impacted cerumen. Nose: Nose normal.      Mouth/Throat:      Lips: Pink. Mouth: Mucous membranes are moist.      Dentition: Normal dentition. Pharynx: Oropharynx is clear. Uvula midline. Eyes:      General: Lids are normal.         Right eye: No discharge. Left eye: No discharge. Extraocular Movements: Extraocular movements intact. Conjunctiva/sclera: Conjunctivae normal.      Right eye: Right conjunctiva is not injected. Left eye: Left conjunctiva is not injected. Pupils: Pupils are equal, round, and reactive to light. Neck:      Thyroid: No thyromegaly. Vascular: No carotid bruit or JVD. Cardiovascular:      Rate and Rhythm: Normal rate and regular rhythm. Pulses: Normal pulses. Carotid pulses are 2+ on the right side and 2+ on the left side. Radial pulses are 2+ on the right side and 2+ on the left side. Heart sounds: Normal heart sounds, S1 normal and S2 normal. No murmur heard. Pulmonary:      Effort: Pulmonary effort is normal.      Breath sounds: Normal breath sounds. Chest:   Breasts:      Right: No supraclavicular adenopathy. Left: No supraclavicular adenopathy. Abdominal:      General: Bowel sounds are normal. There is no distension or abdominal bruit. Palpations: Abdomen is soft. There is no mass. Hernia: No hernia is present.    Musculoskeletal: General: Normal range of motion. Cervical back: Full passive range of motion without pain, normal range of motion and neck supple. Right lower leg: No edema. Left lower leg: No edema. Lymphadenopathy:      Cervical: No cervical adenopathy. Right cervical: No superficial cervical adenopathy. Left cervical: No superficial cervical adenopathy. Upper Body:      Right upper body: No supraclavicular adenopathy. Left upper body: No supraclavicular adenopathy. Skin:     General: Skin is warm and dry. Coloration: Skin is not pale. Findings: No lesion or rash. Nails: There is no clubbing. Neurological:      Mental Status: She is alert and oriented to person, place, and time. Motor: No weakness or tremor. Coordination: Coordination normal.      Deep Tendon Reflexes: Reflexes are normal and symmetric. Psychiatric:         Attention and Perception: Attention normal.         Mood and Affect: Mood normal.         Speech: Speech normal.         Behavior: Behavior normal. Behavior is cooperative. Thought Content: Thought content normal.         Cognition and Memory: Cognition and memory normal.         Judgment: Judgment normal.        /74 (Site: Right Upper Arm, Position: Sitting, Cuff Size: Medium Adult)   Pulse 88   Temp 98 °F (36.7 °C) (Temporal)   Ht 5' (1.524 m)   Wt 157 lb (71.2 kg)   SpO2 98%   BMI 30.66 kg/m²      ASSESSMENT:     Diagnosis Orders   1. Attention deficit hyperactivity disorder (ADHD), unspecified ADHD type stable methylphenidate (CONCERTA) 27 MG extended release tablet   2. Anxiety-stable clonazePAM (KLONOPIN) 0.5 MG tablet   3. Tinnitus of both ears-stable clonazePAM (KLONOPIN) 0.5 MG tablet   4. Restless leg syndrome-stable rOPINIRole (REQUIP) 0.5 MG tablet        PLAN:    1. Aspirin. Continue medication  2./3. Jennifer Kubas. Increase quantity of clonazepam to 3 times a day.   4.  Refill request  Follow-up 3 months

## 2022-08-04 DIAGNOSIS — H93.13 TINNITUS OF BOTH EARS: ICD-10-CM

## 2022-08-04 DIAGNOSIS — F41.9 ANXIETY: ICD-10-CM

## 2022-08-04 DIAGNOSIS — F90.9 ATTENTION DEFICIT HYPERACTIVITY DISORDER (ADHD), UNSPECIFIED ADHD TYPE: ICD-10-CM

## 2022-08-04 RX ORDER — CLONAZEPAM 0.5 MG/1
0.5 TABLET ORAL 3 TIMES DAILY PRN
Qty: 90 TABLET | Refills: 2 | OUTPATIENT
Start: 2022-08-04 | End: 2022-08-14

## 2022-08-04 RX ORDER — METHYLPHENIDATE HYDROCHLORIDE 27 MG/1
27 TABLET ORAL DAILY
Qty: 30 TABLET | Refills: 0 | OUTPATIENT
Start: 2022-08-04 | End: 2022-09-03

## 2022-08-05 DIAGNOSIS — F90.9 ATTENTION DEFICIT HYPERACTIVITY DISORDER (ADHD), UNSPECIFIED ADHD TYPE: ICD-10-CM

## 2022-08-05 RX ORDER — METHYLPHENIDATE HYDROCHLORIDE 27 MG/1
27 TABLET ORAL DAILY
Qty: 30 TABLET | Refills: 0 | Status: SHIPPED | OUTPATIENT
Start: 2022-08-05 | End: 2022-10-12 | Stop reason: SDUPTHER

## 2022-10-12 ENCOUNTER — OFFICE VISIT (OUTPATIENT)
Dept: FAMILY MEDICINE CLINIC | Age: 42
End: 2022-10-12
Payer: COMMERCIAL

## 2022-10-12 VITALS
OXYGEN SATURATION: 98 % | SYSTOLIC BLOOD PRESSURE: 100 MMHG | WEIGHT: 170.4 LBS | RESPIRATION RATE: 16 BRPM | HEART RATE: 107 BPM | HEIGHT: 60 IN | BODY MASS INDEX: 33.45 KG/M2 | TEMPERATURE: 97.1 F | DIASTOLIC BLOOD PRESSURE: 64 MMHG

## 2022-10-12 DIAGNOSIS — Z91.09 ENVIRONMENTAL ALLERGIES: ICD-10-CM

## 2022-10-12 DIAGNOSIS — H93.13 TINNITUS OF BOTH EARS: ICD-10-CM

## 2022-10-12 DIAGNOSIS — M79.672 HEEL PAIN, BILATERAL: ICD-10-CM

## 2022-10-12 DIAGNOSIS — F41.9 ANXIETY: ICD-10-CM

## 2022-10-12 DIAGNOSIS — F90.9 ATTENTION DEFICIT HYPERACTIVITY DISORDER (ADHD), UNSPECIFIED ADHD TYPE: Primary | ICD-10-CM

## 2022-10-12 DIAGNOSIS — K21.9 GASTROESOPHAGEAL REFLUX DISEASE, UNSPECIFIED WHETHER ESOPHAGITIS PRESENT: ICD-10-CM

## 2022-10-12 DIAGNOSIS — M79.671 HEEL PAIN, BILATERAL: ICD-10-CM

## 2022-10-12 PROCEDURE — G8417 CALC BMI ABV UP PARAM F/U: HCPCS | Performed by: FAMILY MEDICINE

## 2022-10-12 PROCEDURE — G8484 FLU IMMUNIZE NO ADMIN: HCPCS | Performed by: FAMILY MEDICINE

## 2022-10-12 PROCEDURE — G8427 DOCREV CUR MEDS BY ELIG CLIN: HCPCS | Performed by: FAMILY MEDICINE

## 2022-10-12 PROCEDURE — 99215 OFFICE O/P EST HI 40 MIN: CPT | Performed by: FAMILY MEDICINE

## 2022-10-12 PROCEDURE — 1036F TOBACCO NON-USER: CPT | Performed by: FAMILY MEDICINE

## 2022-10-12 RX ORDER — METHYLPREDNISOLONE 4 MG/1
TABLET ORAL
Qty: 1 KIT | Refills: 0 | Status: SHIPPED | OUTPATIENT
Start: 2022-10-12 | End: 2022-10-18

## 2022-10-12 RX ORDER — TIRZEPATIDE 2.5 MG/.5ML
2.5 INJECTION, SOLUTION SUBCUTANEOUS WEEKLY
COMMUNITY

## 2022-10-12 RX ORDER — MONTELUKAST SODIUM 10 MG/1
10 TABLET ORAL NIGHTLY
Qty: 30 TABLET | Refills: 2 | Status: SHIPPED | OUTPATIENT
Start: 2022-10-12

## 2022-10-12 RX ORDER — METHYLPHENIDATE HYDROCHLORIDE 27 MG/1
27 TABLET ORAL DAILY
Qty: 30 TABLET | Refills: 0 | Status: SHIPPED | OUTPATIENT
Start: 2022-10-12 | End: 2023-10-12

## 2022-10-12 RX ORDER — IPRATROPIUM BROMIDE 21 UG/1
2 SPRAY, METERED NASAL EVERY 12 HOURS
COMMUNITY

## 2022-10-12 RX ORDER — CELECOXIB 100 MG/1
100 CAPSULE ORAL 2 TIMES DAILY
COMMUNITY

## 2022-10-12 RX ORDER — OMEPRAZOLE 20 MG/1
20 CAPSULE, DELAYED RELEASE ORAL DAILY
Qty: 90 CAPSULE | Refills: 1 | Status: SHIPPED | OUTPATIENT
Start: 2022-10-12

## 2022-10-12 RX ORDER — CLONAZEPAM 0.5 MG/1
0.5 TABLET ORAL 2 TIMES DAILY PRN
Qty: 60 TABLET | Refills: 1 | Status: SHIPPED | OUTPATIENT
Start: 2022-10-12 | End: 2023-10-12

## 2022-10-12 RX ORDER — METHYLPHENIDATE HYDROCHLORIDE 27 MG/1
27 TABLET ORAL DAILY
Qty: 30 TABLET | Refills: 0 | Status: SHIPPED | OUTPATIENT
Start: 2022-11-08 | End: 2023-11-08

## 2022-10-12 ASSESSMENT — ENCOUNTER SYMPTOMS
BACK PAIN: 1
CONSTIPATION: 0
COUGH: 1
VOMITING: 0
NAUSEA: 0
RHINORRHEA: 1
ABDOMINAL PAIN: 0
DIARRHEA: 0
SHORTNESS OF BREATH: 0

## 2022-10-12 NOTE — PROGRESS NOTES
Renan DARLING [address]  Dept: [phone]  Dept Fax: [phone]    Visit type: Established patient    Reason for Visit: Medication Refill and Foot Pain         Assessment and Plan       1. Attention deficit hyperactivity disorder (ADHD), unspecified ADHD type  -     methylphenidate (CONCERTA) 27 MG extended release tablet; Take 1 tablet by mouth daily. , Disp-30 tablet, R-0Normal  -     methylphenidate (CONCERTA) 27 MG extended release tablet; Take 1 tablet by mouth daily. , Disp-30 tablet, R-0Normal  2. Anxiety  -     clonazePAM (KLONOPIN) 0.5 MG tablet; Take 1 tablet by mouth 2 times daily as needed for Anxiety. , Disp-60 tablet, R-1Normal  3. Tinnitus of both ears  4. Gastroesophageal reflux disease, unspecified whether esophagitis present  -     omeprazole (PRILOSEC) 20 MG delayed release capsule; Take 1 capsule by mouth Daily, Disp-90 capsule, R-1Normal  5. Heel pain, bilateral  -     methylPREDNISolone (MEDROL DOSEPACK) 4 MG tablet; Take by mouth., Disp-1 kit, R-0Normal  6. Environmental allergies  -     montelukast (SINGULAIR) 10 MG tablet; Take 1 tablet by mouth nightly, Disp-30 tablet, R-2Normal    Discussed diagnosis, expected course, and proper use of medication, including OTC medications if prescription is too expensive or insurance does not cover. Discussed potential culprits for her heel pain and the possibility of a trial of medication and lifestyle modifications, imaging, and/or specialty evaluation and through shared decision making patient is wishing to pursue a trial of medication with lifestyle modifications initially and potentially further work-up or specialty evaluation depending on symptoms moving forward. With patient doing well with her other medications will continue at this time continue to monitor and adjust course dictates. Stressed the importance of as needed use of the Klonopin only.   With her doing well with her Concerta we will provide a prescription to the pharmacy for this month and a prescription for next month and assuming no issues arise we will plan on follow-up in 2 months for continued monitoring and management of her ADHD symptoms. Discussed signs symptoms require medical attention. All questions were answered and patient voiced understanding and agreement with plan as discussed. Return in about 2 months (around 12/12/2022), or if symptoms worsen or fail to improve. Subjective       HPI   Patient presents to establish care. She states that she is following with [facility] and is on testosterone pellets and B12 injections and was also started on [facility] and is doing well with these medications. She has been following with orthopedic [place] [facility] with Dr. Jada Key for her back and has been on Celebrex which seems to be beneficial for her. She does have a history of anxiety and has been taking Klonopin as needed fashion for anxiety symptoms. She states that she was previously taking it once or twice a day and that 60 pills was more than enough to get her through the month but she started developing issues with tinnitus and the ENT recommended increasing her Klonopin to 3 times a day to see if that would help the tinnitus symptoms. She states that has made no difference and felt like she was taking too much of the medication and for that reason she has backed off to going back to her once or twice daily and is good with going back down on the prescription to be more what she is actually taking. She denies any issues with use of Klonopin or any recent changes to her anxiety symptoms. Patient has a history of ADHD and is doing well with her current dose of Concerta. She feels like the Vyvanse that she was previously on did better but due to insurance and cost issues have a change and she does feel like this is still helpful for her just maybe not as well controlled as she was with the Vyvanse.   she reports that the medicine allows her to stay focused and complete her tasks. Without the medication she has more problems remaining focused and doing the things that she needs to do. she denies any issues with the medication and reports that she continues to have a normal appetite and denies any changes with her sleep with the use of medicine. she has gastroesophageal reflux disease and is doing well with the current use of her Prilosec. she denies any recent changes to her symptoms or any problems from the medication. She has been having bilateral heel pain that is been ongoing for probably about a month. She states that it is involving both of her heels and feels like aching tenderness pain underneath her calcaneus bilaterally. She states that walking tends to make it worse and walking for about 10 minutes will get it flared up to the point where she has problems even walking getting around. [place] and did a lot of walking and it was fairly severe and excruciating because of that. She has been trying different shoes and has been buying good supportive shoes in efforts of improving her symptoms without much benefit. She denies any specific aggravating or relieving factors for her symptoms other than improved with rest and worse with walking. She has not been taking thing for symptoms other than the Celebrex which she takes for her back. She has had some congestion runny nose but has not felt bad and thinks it may be more allergies than anything else. Review of Systems   Constitutional:  Negative for activity change, appetite change, fatigue and fever. HENT:  Positive for congestion and rhinorrhea. Respiratory:  Positive for cough. Negative for shortness of breath. Cardiovascular:  Negative for chest pain and palpitations. Gastrointestinal:  Negative for abdominal pain, constipation, diarrhea, nausea and vomiting. Genitourinary:  Negative for dysuria and hematuria. Musculoskeletal:  Positive for back pain and gait problem. Negative for neck pain. Skin:  Negative for rash and wound. Neurological:  Negative for syncope and weakness. Hematological:  Negative for adenopathy. Does not bruise/bleed easily. Psychiatric/Behavioral:  Positive for decreased concentration. Negative for dysphoric mood and sleep disturbance. The patient is nervous/anxious. Allergies   Allergen Reactions    Betadine [Povidone Iodine]        Outpatient Medications Prior to Visit   Medication Sig Dispense Refill    celecoxib (CELEBREX) 100 MG capsule Take 100 mg by mouth 2 times daily      Tirzepatide (MOUNJARO) 2.5 MG/0.5ML SOPN SC injection Inject 2.5 mg into the skin once a week      ipratropium (ATROVENT) 0.03 % nasal spray 2 sprays by Each Nostril route every 12 hours      brompheniramine-pseudoephedrine 1-15 MG/5ML ELIX elixir Take 5 mLs by mouth every 6 hours as needed      rOPINIRole (REQUIP) 0.5 MG tablet Take 2 tablets by mouth at bedtime 1-2 q hs 60 tablet 5    sertraline (ZOLOFT) 50 MG tablet TAKE ONE TABLET BY MOUTH DAILY. 30 tablet 2    omeprazole (PRILOSEC) 20 MG delayed release capsule Take 20 mg by mouth daily      methylphenidate (CONCERTA) 27 MG extended release tablet Take 1 tablet by mouth in the morning for 30 days. 30 tablet 0    clonazePAM (KLONOPIN) 0.5 MG tablet Take 1 tablet by mouth 3 times daily as needed for Anxiety for up to 10 days. 90 tablet 2    hyoscyamine (ANASPAZ;LEVSIN) 125 MCG tablet Take 1 tablet by mouth every 4 hours as needed for Cramping 180 tablet 3     No facility-administered medications prior to visit.         Past Medical History:   Diagnosis Date    Anxiety     Attention deficit disorder (ADD) without hyperactivity     IBS (irritable bowel syndrome)     Obstructive sleep apnea     Post partum depression         Social History     Tobacco Use    Smoking status: Former     Packs/day: 0.50     Years: 5.00     Pack years: 2.50     Types: Cigarettes Quit date: 2009     Years since quittin.2    Smokeless tobacco: Never   Substance Use Topics    Alcohol use: Not on file        Past Surgical History:   Procedure Laterality Date    BREAST ENHANCEMENT SURGERY       SECTION  ,2019    CHOLECYSTECTOMY         Family History   Problem Relation Age of Onset    Diabetes Mother     High Blood Pressure Father     Other Paternal Grandmother         over produced platelets    Colon Cancer Paternal Grandfather     Cancer Paternal Grandfather         bladder       Objective       /64 (Site: Left Upper Arm, Position: Sitting, Cuff Size: Medium Adult)   Pulse (!) 107   Temp 97.1 °F (36.2 °C) (Infrared)   Resp 16   Ht 5' (1.524 m)   Wt 170 lb 6.4 oz (77.3 kg)   LMP 10/05/2022   SpO2 98%   BMI 33.28 kg/m²   Physical Exam  Vitals and nursing note reviewed. Constitutional:       General: She is not in acute distress. Appearance: Normal appearance. She is well-developed. She is not diaphoretic. HENT:      Head: Normocephalic and atraumatic. Right Ear: External ear normal.      Left Ear: External ear normal.      Nose: Congestion and rhinorrhea present. Mouth/Throat:      Mouth: Mucous membranes are moist.      Pharynx: Oropharynx is clear. No oropharyngeal exudate. Eyes:      General: No scleral icterus. Right eye: No discharge. Left eye: No discharge. Conjunctiva/sclera: Conjunctivae normal.   Neck:      Trachea: No tracheal deviation. Cardiovascular:      Rate and Rhythm: Normal rate and regular rhythm. Heart sounds: Normal heart sounds. No murmur heard. No friction rub. No gallop. Pulmonary:      Effort: Pulmonary effort is normal. No respiratory distress. Breath sounds: Normal breath sounds. No wheezing or rales. Abdominal:      General: Bowel sounds are normal. There is no distension. Palpations: Abdomen is soft. Tenderness: There is no abdominal tenderness. Musculoskeletal:         General: No deformity (No gross deformities of upper or lower extremities) or signs of injury. Normal range of motion. Cervical back: Normal range of motion and neck supple. No muscular tenderness. Right lower leg: No edema. Left lower leg: No edema. Comments: Tenderness noted on the plantar side of the calcaneus and in the arch to some degree. No appreciable deformities noted on exam   Lymphadenopathy:      Cervical: No cervical adenopathy. Skin:     General: Skin is warm and dry. Findings: No erythema or rash. Neurological:      Mental Status: She is alert and oriented to person, place, and time. Cranial Nerves: No cranial nerve deficit. Psychiatric:         Mood and Affect: Mood normal.         Behavior: Behavior normal.         Thought Content:  Thought content normal.         Data Reviewed and Summarized       Labs:     Imaging/Testing:    Time personally spent assessing and managing the patient on the date of service: Est: 40-54 minutes ([id number])      Lobito Hernandez MD

## 2022-11-23 ENCOUNTER — OFFICE VISIT (OUTPATIENT)
Dept: FAMILY MEDICINE CLINIC | Age: 42
End: 2022-11-23
Payer: COMMERCIAL

## 2022-11-23 VITALS
TEMPERATURE: 98.7 F | BODY MASS INDEX: 32.79 KG/M2 | HEART RATE: 72 BPM | HEIGHT: 60 IN | WEIGHT: 167 LBS | OXYGEN SATURATION: 98 % | SYSTOLIC BLOOD PRESSURE: 110 MMHG | DIASTOLIC BLOOD PRESSURE: 78 MMHG

## 2022-11-23 DIAGNOSIS — M79.671 HEEL PAIN, BILATERAL: Primary | ICD-10-CM

## 2022-11-23 DIAGNOSIS — F41.9 ANXIETY: ICD-10-CM

## 2022-11-23 DIAGNOSIS — F90.9 ATTENTION DEFICIT HYPERACTIVITY DISORDER (ADHD), UNSPECIFIED ADHD TYPE: ICD-10-CM

## 2022-11-23 DIAGNOSIS — M79.672 HEEL PAIN, BILATERAL: Primary | ICD-10-CM

## 2022-11-23 PROCEDURE — 99214 OFFICE O/P EST MOD 30 MIN: CPT | Performed by: FAMILY MEDICINE

## 2022-11-23 RX ORDER — CLONAZEPAM 0.5 MG/1
0.5 TABLET ORAL 2 TIMES DAILY PRN
Qty: 60 TABLET | Refills: 1 | Status: SHIPPED | OUTPATIENT
Start: 2022-11-23 | End: 2023-11-23

## 2022-11-23 RX ORDER — METHYLPHENIDATE HYDROCHLORIDE 27 MG/1
27 TABLET ORAL DAILY
Qty: 30 TABLET | Refills: 0 | Status: SHIPPED | OUTPATIENT
Start: 2023-01-05 | End: 2024-01-05

## 2022-11-23 RX ORDER — METHYLPHENIDATE HYDROCHLORIDE 27 MG/1
27 TABLET ORAL DAILY
Qty: 30 TABLET | Refills: 0 | Status: SHIPPED | OUTPATIENT
Start: 2022-12-08 | End: 2023-12-08

## 2022-11-23 ASSESSMENT — ENCOUNTER SYMPTOMS
DIARRHEA: 0
BACK PAIN: 1
RHINORRHEA: 0
ABDOMINAL PAIN: 0
NAUSEA: 0
CONSTIPATION: 0
COUGH: 1
VOMITING: 0
SHORTNESS OF BREATH: 0

## 2022-11-23 NOTE — PROGRESS NOTES
Madimariza Elvira [facility]  [address] [facility] [address]  Dept: [phone]  Dept Fax: [phone]    Visit type: Established patient    Reason for Visit: Foot Pain (Bilateral )         Assessment and Plan       1. Heel pain, bilateral  -     XR CALCANEUS LEFT (MIN 2 VIEWS); Future  -     XR CALCANEUS RIGHT (MIN 2 VIEWS); Future  -     External Referral To Podiatry  2. Attention deficit hyperactivity disorder (ADHD), unspecified ADHD type  -     methylphenidate (CONCERTA) 27 MG extended release tablet; Take 1 tablet by mouth daily. , Disp-30 tablet, R-0Normal  -     methylphenidate (CONCERTA) 27 MG extended release tablet; Take 1 tablet by mouth daily. , Disp-30 tablet, R-0Normal  3. Anxiety  -     clonazePAM (KLONOPIN) 0.5 MG tablet; Take 1 tablet by mouth 2 times daily as needed for Anxiety. , Disp-60 tablet, R-1Normal    With her continued heel pain even with conservative management discussed possibility of x-rays for further evaluation and involving a podiatrist in her care and patient was agreeable with this plan. Discussed continued efforts at lifestyle modifications that may be beneficial for her heel pain. With patient doing well with her current use of Concerta we will continue at this time with a prescription into the pharmacy for this month and a prescription for next month and assuming no issues arise we will plan on follow-up in 2 months for continued monitoring and management of her ADHD. Stressed importance of as needed use of her Klonopin only. Discussed signs symptoms requiring medical attention. All questions were answered and patient voiced understanding and agreement with plan as discussed. Return in about 2 months (around 1/23/2023), or if symptoms worsen or fail to improve. Subjective       HPI   Patient has been having bilateral heel pain that is been ongoing for about 2 and half months now.   She has been having tenderness and pain directly under her calcaneus and possibly anterior to the calcaneus bilaterally she states that walking tends to make it worse. She has been trying different shoes and more supportive shoes and is possibly given her some benefit but has not had any significant benefit to her symptoms. At her visit a little over a month ago she was provided a course of steroids and has been doing the stretches and exercises in efforts to improve her symptoms with potentially some mild benefit from the previously provided treatment but nothing significant and her symptoms of still been going on. She otherwise denies any other aggravating or alleviating factors for her heel pain. Patient has a history of ADHD and is doing well with her current dose of Concerta. she reports that the medicine allows her to stay focused and complete her tasks. Without the medication she has more problems remaining focused and doing the things that she needs to do. she denies any issues with the medication and reports that she continues to have a normal appetite and denies any changes with her sleep with the use of medicine. She does also have issues with anxiety but continues to benefit from the use of Klonopin. She denies any problems with medicine and recent changes to her anxiety symptoms. Review of Systems   Constitutional:  Negative for activity change, appetite change, fatigue and fever. HENT:  Negative for congestion and rhinorrhea. Respiratory:  Positive for cough. Negative for shortness of breath. Cardiovascular:  Negative for chest pain and palpitations. Gastrointestinal:  Negative for abdominal pain, constipation, diarrhea, nausea and vomiting. Genitourinary:  Negative for dysuria and hematuria. Musculoskeletal:  Positive for back pain and gait problem. Negative for neck pain. Skin:  Negative for rash and wound. Neurological:  Negative for syncope and weakness. Hematological:  Negative for adenopathy. Does not bruise/bleed easily. Psychiatric/Behavioral:  Positive for decreased concentration. Negative for dysphoric mood and sleep disturbance. The patient is nervous/anxious. Allergies   Allergen Reactions    Betadine [Povidone Iodine]        Outpatient Medications Prior to Visit   Medication Sig Dispense Refill    celecoxib (CELEBREX) 100 MG capsule Take 100 mg by mouth 2 times daily      Tirzepatide (MOUNJARO) 2.5 MG/0.5ML SOPN SC injection Inject 2.5 mg into the skin once a week      ipratropium (ATROVENT) 0.03 % nasal spray 2 sprays by Each Nostril route every 12 hours      brompheniramine-pseudoephedrine 1-15 MG/5ML ELIX elixir Take 5 mLs by mouth every 6 hours as needed      montelukast (SINGULAIR) 10 MG tablet Take 1 tablet by mouth nightly 30 tablet 2    omeprazole (PRILOSEC) 20 MG delayed release capsule Take 1 capsule by mouth Daily 90 capsule 1    rOPINIRole (REQUIP) 0.5 MG tablet Take 2 tablets by mouth at bedtime 1-2 q hs 60 tablet 5    sertraline (ZOLOFT) 50 MG tablet TAKE ONE TABLET BY MOUTH DAILY. 30 tablet 2    omeprazole (PRILOSEC) 20 MG delayed release capsule Take 20 mg by mouth daily      clonazePAM (KLONOPIN) 0.5 MG tablet Take 1 tablet by mouth 2 times daily as needed for Anxiety. 60 tablet 1    methylphenidate (CONCERTA) 27 MG extended release tablet Take 1 tablet by mouth daily. 30 tablet 0    methylphenidate (CONCERTA) 27 MG extended release tablet Take 1 tablet by mouth daily. 30 tablet 0     No facility-administered medications prior to visit.         Past Medical History:   Diagnosis Date    Anxiety     Attention deficit disorder (ADD) without hyperactivity     IBS (irritable bowel syndrome)     Obstructive sleep apnea     Post partum depression         Social History     Tobacco Use    Smoking status: Former     Packs/day: 0.50     Years: 5.00     Pack years: 2.50     Types: Cigarettes     Quit date: 2009     Years since quittin.3    Smokeless tobacco: Never   Substance Use Topics    Alcohol use: Not on file        Past Surgical History:   Procedure Laterality Date    BREAST ENHANCEMENT SURGERY       SECTION  ,2019    CHOLECYSTECTOMY         Family History   Problem Relation Age of Onset    Diabetes Mother     High Blood Pressure Father     Other Paternal Grandmother         over produced platelets    Colon Cancer Paternal Grandfather     Cancer Paternal Grandfather         bladder       Objective       /78 (Site: Left Upper Arm, Position: Sitting, Cuff Size: Medium Adult)   Pulse 72   Temp 98.7 °F (37.1 °C) (Temporal)   Ht 5' (1.524 m)   Wt 167 lb (75.8 kg)   SpO2 98%   BMI 32.61 kg/m²   Physical Exam  Vitals and nursing note reviewed. Constitutional:       General: She is not in acute distress. Appearance: Normal appearance. She is well-developed. She is not diaphoretic. HENT:      Head: Normocephalic and atraumatic. Right Ear: External ear normal.      Left Ear: External ear normal.      Nose: Nose normal.      Mouth/Throat:      Mouth: Mucous membranes are moist.      Pharynx: Oropharynx is clear. Eyes:      General: No scleral icterus. Right eye: No discharge. Left eye: No discharge. Conjunctiva/sclera: Conjunctivae normal.   Neck:      Trachea: No tracheal deviation. Cardiovascular:      Rate and Rhythm: Normal rate and regular rhythm. Heart sounds: Normal heart sounds. No murmur heard. No friction rub. No gallop. Pulmonary:      Effort: Pulmonary effort is normal. No respiratory distress. Breath sounds: Normal breath sounds. No wheezing or rales. Abdominal:      General: Bowel sounds are normal. There is no distension. Palpations: Abdomen is soft. Tenderness: There is no abdominal tenderness. Musculoskeletal:         General: No deformity (No gross deformities of upper or lower extremities) or signs of injury. Normal range of motion. Cervical back: Normal range of motion and neck supple.  No muscular tenderness. Right lower leg: No edema. Left lower leg: No edema. Lymphadenopathy:      Cervical: No cervical adenopathy. Skin:     General: Skin is warm and dry. Findings: No erythema or rash. Neurological:      Mental Status: She is alert and oriented to person, place, and time. Cranial Nerves: No cranial nerve deficit. Psychiatric:         Mood and Affect: Mood normal.         Behavior: Behavior normal.         Thought Content:  Thought content normal.         Data Reviewed and Summarized       Labs:     Imaging/Testing:        Arabella Tucker MD

## 2022-12-10 DIAGNOSIS — F90.9 ATTENTION DEFICIT HYPERACTIVITY DISORDER (ADHD), UNSPECIFIED ADHD TYPE: ICD-10-CM

## 2022-12-12 RX ORDER — METHYLPHENIDATE HYDROCHLORIDE 27 MG/1
TABLET, EXTENDED RELEASE ORAL
Qty: 30 TABLET | Refills: 0 | OUTPATIENT
Start: 2022-12-12

## 2023-01-28 DIAGNOSIS — F41.9 ANXIETY: ICD-10-CM

## 2023-01-30 RX ORDER — CLONAZEPAM 0.5 MG/1
0.5 TABLET ORAL 2 TIMES DAILY PRN
Qty: 60 TABLET | Refills: 1 | OUTPATIENT
Start: 2023-01-30 | End: 2024-01-30

## 2023-03-17 ENCOUNTER — OFFICE VISIT (OUTPATIENT)
Dept: OBGYN CLINIC | Age: 43
End: 2023-03-17

## 2023-03-17 VITALS
SYSTOLIC BLOOD PRESSURE: 112 MMHG | HEIGHT: 60 IN | BODY MASS INDEX: 30.04 KG/M2 | HEART RATE: 84 BPM | DIASTOLIC BLOOD PRESSURE: 73 MMHG | WEIGHT: 153 LBS

## 2023-03-17 DIAGNOSIS — Z12.31 ENCOUNTER FOR SCREENING MAMMOGRAM FOR MALIGNANT NEOPLASM OF BREAST: ICD-10-CM

## 2023-03-17 DIAGNOSIS — Z76.89 ENCOUNTER TO ESTABLISH CARE: Primary | ICD-10-CM

## 2023-03-17 DIAGNOSIS — Z11.51 ENCOUNTER FOR SCREENING FOR HUMAN PAPILLOMAVIRUS (HPV): ICD-10-CM

## 2023-03-17 DIAGNOSIS — Z01.419 WELL WOMAN EXAM WITH ROUTINE GYNECOLOGICAL EXAM: ICD-10-CM

## 2023-03-17 DIAGNOSIS — N89.8 VAGINAL DRYNESS: ICD-10-CM

## 2023-03-17 DIAGNOSIS — Z12.4 CERVICAL CANCER SCREENING: ICD-10-CM

## 2023-03-17 DIAGNOSIS — F52.31 INHIBITED ORGASM FEMALE: ICD-10-CM

## 2023-03-17 DIAGNOSIS — Z12.39 SCREENING BREAST EXAMINATION: ICD-10-CM

## 2023-03-17 LAB — HPV16+18+H RISK 12 DNA SPEC-IMP: NORMAL

## 2023-03-17 ASSESSMENT — ENCOUNTER SYMPTOMS
SHORTNESS OF BREATH: 0
NAUSEA: 0
CONSTIPATION: 0
GASTROINTESTINAL NEGATIVE: 1
BACK PAIN: 0
ABDOMINAL PAIN: 0
CHEST TIGHTNESS: 0
RECTAL PAIN: 0
DIARRHEA: 0
RESPIRATORY NEGATIVE: 1

## 2023-03-17 NOTE — PROGRESS NOTES
Pt presents today for pap smear and breast exam.      Last mammogram: 2 yrs   Last pap smear:    Contraception:    :  2  Para:  2  AB:    Last bone density:  na  Last colonoscopy:

## 2023-03-17 NOTE — PROGRESS NOTES
[facility]  CNM Office Note    Capri Bruce is a 40 y.o. female who presents today for her medical conditions/ complaints as noted below. Chief Complaint   Patient presents with    Annual Exam     Last mammogram: 2 yrs   Last pap smear:    Contraception:  tubal   :  2  Para:  2  AB:    Last bone density:  na  Last colonoscopy:   22    HPI  April presents today to establish care. She is due for annual exam. She has never had an abnormal pap smear or abnormal mammogram. She states her Concerta works well for her but she has had trouble finding it. She denies issues with her bowel or bladder. She is on Mounjaro for weight loss and has lost about 15 pounds. Her periods are regular, mild cramping, last about 5 days, and not heavy. She has had some sexual dysfunction. She feels well off of her Zoloft and has been off of it for about 2-3 months. Problems/Complaints today:  1. Encounter to establish care  2. Well woman exam with routine gynecological exam  3. Cervical cancer screening  -     PAP SMEAR  -     Human papillomavirus (HPV) DNA probe thin prep high risk  4. Encounter for screening for human papillomavirus (HPV)  -     Human papillomavirus (HPV) DNA probe thin prep high risk  5. Encounter for screening mammogram for malignant neoplasm of breast  -     SABRINA DIGITAL SCREEN W OR WO CAD BILATERAL; Future  6. Screening breast examination  7. 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 No obstetric history on file.     Past Medical History:   Diagnosis Date    Anxiety     Attention deficit disorder (ADD) without hyperactivity     IBS (irritable bowel syndrome)     Obstructive sleep apnea     Post partum depression      Past Surgical History:   Procedure Laterality Date    BREAST ENHANCEMENT SURGERY       SECTION  7353,9130    CHOLECYSTECTOMY       Family History   Problem Relation Age of Onset    Diabetes Mother     High Blood Pressure Father     Other Paternal Grandmother         over produced platelets    Colon Cancer Paternal Grandfather     Cancer Paternal Grandfather         bladder     Social History     Tobacco Use    Smoking status: Former     Packs/day: 0.50     Years: 5.00     Pack years: 2.50     Types: Cigarettes     Quit date: 2009     Years since quittin.6    Smokeless tobacco: Never   Substance Use Topics    Alcohol use: Not on file       Current Outpatient Medications   Medication Sig Dispense Refill    methylphenidate (CONCERTA) 27 MG extended release tablet Take 1 tablet by mouth daily. 30 tablet 0    clonazePAM (KLONOPIN) 0.5 MG tablet Take 1 tablet by mouth 2 times daily as needed for Anxiety. 60 tablet 1    celecoxib (CELEBREX) 100 MG capsule Take 100 mg by mouth 2 times daily      Tirzepatide (MOUNJARO) 2.5 MG/0.5ML SOPN SC injection Inject 7.5 mg into the skin once a week      omeprazole (PRILOSEC) 20 MG delayed release capsule Take 1 capsule by mouth Daily 90 capsule 1    rOPINIRole (REQUIP) 0.5 MG tablet Take 2 tablets by mouth at bedtime 1-2 q hs 60 tablet 5    methylphenidate (CONCERTA) 27 MG extended release tablet Take 1 tablet by mouth daily. (Patient not taking: Reported on 3/17/2023) 30 tablet 0    ipratropium (ATROVENT) 0.03 % nasal spray 2 sprays by Each Nostril route every 12 hours      brompheniramine-pseudoephedrine 1-15 MG/5ML ELIX elixir Take 5 mLs by mouth every 6 hours as needed      montelukast (SINGULAIR) 10 MG tablet Take 1 tablet by mouth nightly 30 tablet 2    omeprazole (PRILOSEC) 20 MG delayed release capsule Take 20 mg by mouth daily (Patient not taking: Reported on 3/17/2023)       No current facility-administered medications for this visit.      Allergies   Allergen Reactions    Betadine [Povidone Iodine]      Vitals:    03/17/23 0842   BP: 112/73   Pulse: 84   Weight: 153 lb (69.4 kg)   Height: 5' (1.524 m)     Body mass index is 29.88 kg/m². Review of Systems   Constitutional: Negative. Negative for activity change, fever and unexpected weight change. Respiratory: Negative. Negative for chest tightness and shortness of breath. Cardiovascular: Negative. Negative for chest pain. Gastrointestinal: Negative. Negative for abdominal pain, constipation, diarrhea, nausea and rectal pain. Genitourinary:  Positive for dyspareunia (dryness). Negative for difficulty urinating, frequency, genital sores, menstrual problem, pelvic pain, vaginal discharge and vaginal pain. Musculoskeletal: Negative. Negative for back pain and gait problem. Skin: Negative. Neurological: Negative. Negative for dizziness and headaches. Psychiatric/Behavioral: Negative. Negative for behavioral problems, self-injury and suicidal ideas. The patient is not nervous/anxious. Physical Exam  Vitals and nursing note reviewed. Constitutional:       Appearance: She is well-developed. HENT:      Head: Normocephalic and atraumatic. Eyes:      Conjunctiva/sclera: Conjunctivae normal.      Pupils: Pupils are equal, round, and reactive to light. Neck:      Thyroid: No thyromegaly. Trachea: No tracheal deviation. Cardiovascular:      Rate and Rhythm: Normal rate and regular rhythm. Heart sounds: Normal heart sounds. No murmur heard. Pulmonary:      Effort: Pulmonary effort is normal. No respiratory distress. Breath sounds: Normal breath sounds. Chest:      Comments: Breasts symmetrical without tenderness, masses, or nipple discharge. Nipples everted bilaterally. Abdominal:      General: There is no distension. Palpations: Abdomen is soft. Tenderness: There is no abdominal tenderness. There is no guarding. Genitourinary:     General: Normal vulva.       Exam position: Lithotomy position. Labia:         Right: No rash or lesion. Left: No rash or lesion. Vagina: Normal. No erythema or lesions. Cervix: Normal.      Uterus: Normal. Not tender. Adnexa: Right adnexa normal and left adnexa normal.        Right: No mass, tenderness or fullness. Left: No mass, tenderness or fullness. Musculoskeletal:         General: Normal range of motion. Cervical back: Normal range of motion and neck supple. Skin:     General: Skin is warm and dry. Capillary Refill: Capillary refill takes less than 2 seconds. Neurological:      Mental Status: She is alert and oriented to person, place, and time. Psychiatric:         Behavior: Behavior normal.         Thought Content: Thought content normal.         Judgment: Judgment normal.       MEDICATIONS:  No orders of the defined types were placed in this encounter. ORDERS:  Orders Placed This Encounter   Procedures    SABRINA DIGITAL SCREEN W OR WO CAD BILATERAL    PAP SMEAR    Human papillomavirus (HPV) DNA probe thin prep high risk       Plan:  WWE - pap collected, self breast exam educated and encouraged, clinical breast exam performed. Declines annual labs today. Sexual dysfunction - Dream Cream + Sildenafil rx sent to [facility] [place].    Return in 1 year for [facility]

## 2023-03-21 LAB
HPV HR 12 DNA SPEC QL NAA+PROBE: NOT DETECTED
HPV16 DNA SPEC QL NAA+PROBE: NOT DETECTED
HPV16+18+H RISK 12 DNA SPEC-IMP: NORMAL
HPV18 DNA SPEC QL NAA+PROBE: NOT DETECTED

## 2023-06-19 ENCOUNTER — OFFICE VISIT (OUTPATIENT)
Dept: FAMILY MEDICINE CLINIC | Age: 43
End: 2023-06-19
Payer: COMMERCIAL

## 2023-06-19 ENCOUNTER — PATIENT MESSAGE (OUTPATIENT)
Dept: FAMILY MEDICINE CLINIC | Age: 43
End: 2023-06-19

## 2023-06-19 VITALS
TEMPERATURE: 97.5 F | OXYGEN SATURATION: 100 % | HEART RATE: 79 BPM | DIASTOLIC BLOOD PRESSURE: 70 MMHG | HEIGHT: 60 IN | BODY MASS INDEX: 28.69 KG/M2 | SYSTOLIC BLOOD PRESSURE: 110 MMHG | WEIGHT: 146.13 LBS

## 2023-06-19 DIAGNOSIS — F90.9 ATTENTION DEFICIT HYPERACTIVITY DISORDER (ADHD), UNSPECIFIED ADHD TYPE: Primary | ICD-10-CM

## 2023-06-19 DIAGNOSIS — F41.9 ANXIETY: ICD-10-CM

## 2023-06-19 DIAGNOSIS — K21.9 GASTROESOPHAGEAL REFLUX DISEASE, UNSPECIFIED WHETHER ESOPHAGITIS PRESENT: ICD-10-CM

## 2023-06-19 DIAGNOSIS — R42 ORTHOSTATIC DIZZINESS: ICD-10-CM

## 2023-06-19 PROCEDURE — 99214 OFFICE O/P EST MOD 30 MIN: CPT | Performed by: FAMILY MEDICINE

## 2023-06-19 RX ORDER — MIDODRINE HYDROCHLORIDE 2.5 MG/1
2.5 TABLET ORAL 3 TIMES DAILY
Qty: 90 TABLET | Refills: 1 | Status: SHIPPED | OUTPATIENT
Start: 2023-06-19

## 2023-06-19 RX ORDER — OMEPRAZOLE 20 MG/1
20 CAPSULE, DELAYED RELEASE ORAL DAILY
Qty: 90 CAPSULE | Refills: 1 | Status: SHIPPED | OUTPATIENT
Start: 2023-06-19

## 2023-06-19 ASSESSMENT — PATIENT HEALTH QUESTIONNAIRE - PHQ9
7. TROUBLE CONCENTRATING ON THINGS, SUCH AS READING THE NEWSPAPER OR WATCHING TELEVISION: 0
3. TROUBLE FALLING OR STAYING ASLEEP: 0
SUM OF ALL RESPONSES TO PHQ QUESTIONS 1-9: 0
SUM OF ALL RESPONSES TO PHQ QUESTIONS 1-9: 0
1. LITTLE INTEREST OR PLEASURE IN DOING THINGS: 0
9. THOUGHTS THAT YOU WOULD BE BETTER OFF DEAD, OR OF HURTING YOURSELF: 0
SUM OF ALL RESPONSES TO PHQ9 QUESTIONS 1 & 2: 0
SUM OF ALL RESPONSES TO PHQ QUESTIONS 1-9: 0
SUM OF ALL RESPONSES TO PHQ QUESTIONS 1-9: 0
5. POOR APPETITE OR OVEREATING: 0
4. FEELING TIRED OR HAVING LITTLE ENERGY: 0
2. FEELING DOWN, DEPRESSED OR HOPELESS: 0
8. MOVING OR SPEAKING SO SLOWLY THAT OTHER PEOPLE COULD HAVE NOTICED. OR THE OPPOSITE, BEING SO FIGETY OR RESTLESS THAT YOU HAVE BEEN MOVING AROUND A LOT MORE THAN USUAL: 0
6. FEELING BAD ABOUT YOURSELF - OR THAT YOU ARE A FAILURE OR HAVE LET YOURSELF OR YOUR FAMILY DOWN: 0
10. IF YOU CHECKED OFF ANY PROBLEMS, HOW DIFFICULT HAVE THESE PROBLEMS MADE IT FOR YOU TO DO YOUR WORK, TAKE CARE OF THINGS AT HOME, OR GET ALONG WITH OTHER PEOPLE: 0

## 2023-06-19 ASSESSMENT — ENCOUNTER SYMPTOMS
RHINORRHEA: 0
DIARRHEA: 0
BACK PAIN: 1
CONSTIPATION: 0
SHORTNESS OF BREATH: 0
VOMITING: 0
ABDOMINAL PAIN: 0
NAUSEA: 0
COUGH: 0

## 2023-06-19 NOTE — PROGRESS NOTES
Paternal Grandfather         bladder       Objective       /70 (Site: Left Upper Arm, Position: Sitting, Cuff Size: Medium Adult)   Pulse 79   Temp 97.5 °F (36.4 °C) (Temporal)   Ht 5' (1.524 m)   Wt 146 lb 2 oz (66.3 kg)   SpO2 100%   BMI 28.54 kg/m²   Physical Exam  Vitals and nursing note reviewed. Constitutional:       General: She is not in acute distress. Appearance: Normal appearance. She is well-developed. She is not diaphoretic. HENT:      Head: Normocephalic and atraumatic. Right Ear: External ear normal.      Left Ear: External ear normal.      Nose: Nose normal.      Mouth/Throat:      Mouth: Mucous membranes are moist.      Pharynx: Oropharynx is clear. No oropharyngeal exudate. Eyes:      General: No scleral icterus. Right eye: No discharge. Left eye: No discharge. Conjunctiva/sclera: Conjunctivae normal.   Neck:      Trachea: No tracheal deviation. Cardiovascular:      Rate and Rhythm: Normal rate and regular rhythm. Heart sounds: Normal heart sounds. No murmur heard. No friction rub. No gallop. Pulmonary:      Effort: Pulmonary effort is normal. No respiratory distress. Breath sounds: Normal breath sounds. No wheezing or rales. Abdominal:      General: Bowel sounds are normal. There is no distension. Palpations: Abdomen is soft. Tenderness: There is no abdominal tenderness. Musculoskeletal:         General: No deformity (No gross deformities of upper or lower extremities) or signs of injury. Normal range of motion. Cervical back: Normal range of motion and neck supple. No muscular tenderness. Right lower leg: No edema. Left lower leg: No edema. Lymphadenopathy:      Cervical: No cervical adenopathy. Skin:     General: Skin is warm and dry. Findings: No erythema or rash. Neurological:      Mental Status: She is alert and oriented to person, place, and time.       Cranial Nerves: No cranial nerve intermittent

## 2023-06-20 RX ORDER — CELECOXIB 100 MG/1
100 CAPSULE ORAL 2 TIMES DAILY
COMMUNITY

## 2023-08-21 ENCOUNTER — OFFICE VISIT (OUTPATIENT)
Dept: FAMILY MEDICINE CLINIC | Age: 43
End: 2023-08-21
Payer: COMMERCIAL

## 2023-08-21 VITALS
SYSTOLIC BLOOD PRESSURE: 90 MMHG | BODY MASS INDEX: 26.66 KG/M2 | HEIGHT: 60 IN | OXYGEN SATURATION: 99 % | DIASTOLIC BLOOD PRESSURE: 58 MMHG | RESPIRATION RATE: 16 BRPM | HEART RATE: 80 BPM | TEMPERATURE: 97.7 F | WEIGHT: 135.8 LBS

## 2023-08-21 DIAGNOSIS — F90.9 ATTENTION DEFICIT HYPERACTIVITY DISORDER (ADHD), UNSPECIFIED ADHD TYPE: Primary | ICD-10-CM

## 2023-08-21 DIAGNOSIS — F41.9 ANXIETY: ICD-10-CM

## 2023-08-21 DIAGNOSIS — Z87.898 HISTORY OF PREDIABETES: ICD-10-CM

## 2023-08-21 DIAGNOSIS — Z13.220 SCREENING CHOLESTEROL LEVEL: ICD-10-CM

## 2023-08-21 PROCEDURE — 99214 OFFICE O/P EST MOD 30 MIN: CPT | Performed by: FAMILY MEDICINE

## 2023-08-21 RX ORDER — CLONAZEPAM 0.5 MG/1
0.5 TABLET ORAL DAILY PRN
Qty: 30 TABLET | Refills: 1 | Status: SHIPPED | OUTPATIENT
Start: 2023-08-21 | End: 2024-08-20

## 2023-08-21 RX ORDER — TIRZEPATIDE 7.5 MG/.5ML
7.5 INJECTION, SOLUTION SUBCUTANEOUS WEEKLY
COMMUNITY

## 2023-08-21 SDOH — ECONOMIC STABILITY: INCOME INSECURITY: HOW HARD IS IT FOR YOU TO PAY FOR THE VERY BASICS LIKE FOOD, HOUSING, MEDICAL CARE, AND HEATING?: NOT HARD AT ALL

## 2023-08-21 SDOH — ECONOMIC STABILITY: FOOD INSECURITY: WITHIN THE PAST 12 MONTHS, THE FOOD YOU BOUGHT JUST DIDN'T LAST AND YOU DIDN'T HAVE MONEY TO GET MORE.: NEVER TRUE

## 2023-08-21 SDOH — ECONOMIC STABILITY: HOUSING INSECURITY
IN THE LAST 12 MONTHS, WAS THERE A TIME WHEN YOU DID NOT HAVE A STEADY PLACE TO SLEEP OR SLEPT IN A SHELTER (INCLUDING NOW)?: NO

## 2023-08-21 SDOH — ECONOMIC STABILITY: FOOD INSECURITY: WITHIN THE PAST 12 MONTHS, YOU WORRIED THAT YOUR FOOD WOULD RUN OUT BEFORE YOU GOT MONEY TO BUY MORE.: NEVER TRUE

## 2023-08-21 ASSESSMENT — ENCOUNTER SYMPTOMS
ABDOMINAL PAIN: 0
DIARRHEA: 0
CONSTIPATION: 0
COUGH: 0
NAUSEA: 0
RHINORRHEA: 0
VOMITING: 0
BACK PAIN: 1
SHORTNESS OF BREATH: 0

## 2023-08-21 NOTE — PROGRESS NOTES
[facility]  [address] [facility] [address] [facility]  Dept: [phone]  Dept Fax: [phone]    Visit type: Established patient    Reason for Visit: Medication Refill         Assessment and Plan       1. Attention deficit hyperactivity disorder (ADHD), unspecified ADHD type  -     lisdexamfetamine (VYVANSE) 30 MG capsule; Take 1 capsule by mouth every morning. Max Daily Amount: 30 mg, Disp-30 capsule, R-0Normal  -     lisdexamfetamine (VYVANSE) 30 MG capsule; Take 1 capsule by mouth every morning. Max Daily Amount: 30 mg, Disp-30 capsule, R-0Normal  2. Anxiety  -     clonazePAM (KLONOPIN) 0.5 MG tablet; Take 1 tablet by mouth daily as needed for Anxiety. Max Daily Amount: 0.5 mg, Disp-30 tablet, R-1Normal  3. History of prediabetes  -     CBC with Auto Differential; Future  -     Comprehensive Metabolic Panel; Future  -     Hemoglobin A1C; Future  -     TSH; Future  4. Screening cholesterol level  -     Lipid Panel; Future    With patient doing well with her current use of Vyvanse we will continue at this time with a prescription into the pharmacy for this month and a prescription for next month and assuming no issues arise we will plan on follow-up in 2 months for continued monitoring and management of her ADHD. Stressed the importance of as needed use of her Klonopin only. Discussed lifestyle modifications that may be beneficial for her symptoms. Discussed signs symptoms requiring medical attention. All questions were answered and patient voiced understanding and agreement with plan as discussed. Return in about 2 months (around 10/21/2023), or if symptoms worsen or fail to improve. Subjective       HPI   Patient has a history of ADHD and is doing well with her current dose of Vyvanse. she reports that the medicine allows her to stay focused and complete her tasks.   Without the medication she has more problems remaining focused and doing the things that she needs to do. she denies

## 2023-08-23 DIAGNOSIS — Z87.898 HISTORY OF PREDIABETES: ICD-10-CM

## 2023-08-23 DIAGNOSIS — Z13.220 SCREENING CHOLESTEROL LEVEL: ICD-10-CM

## 2023-08-23 LAB
ALBUMIN SERPL-MCNC: 4.3 G/DL (ref 3.5–5.2)
ALP SERPL-CCNC: 63 U/L (ref 35–104)
ALT SERPL-CCNC: 10 U/L (ref 5–33)
ANION GAP SERPL CALCULATED.3IONS-SCNC: 13 MMOL/L (ref 7–19)
AST SERPL-CCNC: 13 U/L (ref 5–32)
BASOPHILS # BLD: 0 K/UL (ref 0–0.2)
BASOPHILS NFR BLD: 0.6 % (ref 0–1)
BILIRUB SERPL-MCNC: 0.6 MG/DL (ref 0.2–1.2)
BUN SERPL-MCNC: 13 MG/DL (ref 6–20)
CALCIUM SERPL-MCNC: 9 MG/DL (ref 8.6–10)
CHLORIDE SERPL-SCNC: 105 MMOL/L (ref 98–111)
CHOLEST SERPL-MCNC: 140 MG/DL (ref 160–199)
CO2 SERPL-SCNC: 21 MMOL/L (ref 22–29)
CREAT SERPL-MCNC: 0.7 MG/DL (ref 0.5–0.9)
EOSINOPHIL # BLD: 0.1 K/UL (ref 0–0.6)
EOSINOPHIL NFR BLD: 1.1 % (ref 0–5)
ERYTHROCYTE [DISTWIDTH] IN BLOOD BY AUTOMATED COUNT: 12.8 % (ref 11.5–14.5)
GLUCOSE SERPL-MCNC: 85 MG/DL (ref 74–109)
HBA1C MFR BLD: 5.1 % (ref 4–6)
HCT VFR BLD AUTO: 42.5 % (ref 37–47)
HDLC SERPL-MCNC: 43 MG/DL (ref 65–121)
HGB BLD-MCNC: 13.9 G/DL (ref 12–16)
IMM GRANULOCYTES # BLD: 0 K/UL
LDLC SERPL CALC-MCNC: 83 MG/DL
LYMPHOCYTES # BLD: 1.9 K/UL (ref 1.1–4.5)
LYMPHOCYTES NFR BLD: 28.9 % (ref 20–40)
MCH RBC QN AUTO: 28.1 PG (ref 27–31)
MCHC RBC AUTO-ENTMCNC: 32.7 G/DL (ref 33–37)
MCV RBC AUTO: 86 FL (ref 81–99)
MONOCYTES # BLD: 0.5 K/UL (ref 0–0.9)
MONOCYTES NFR BLD: 7.9 % (ref 0–10)
NEUTROPHILS # BLD: 4 K/UL (ref 1.5–7.5)
NEUTS SEG NFR BLD: 61.3 % (ref 50–65)
PLATELET # BLD AUTO: 316 K/UL (ref 130–400)
PMV BLD AUTO: 10.1 FL (ref 9.4–12.3)
POTASSIUM SERPL-SCNC: 4.4 MMOL/L (ref 3.5–5)
PROT SERPL-MCNC: 6.9 G/DL (ref 6.6–8.7)
RBC # BLD AUTO: 4.94 M/UL (ref 4.2–5.4)
SODIUM SERPL-SCNC: 139 MMOL/L (ref 136–145)
TRIGL SERPL-MCNC: 72 MG/DL (ref 0–149)
TSH SERPL DL<=0.005 MIU/L-ACNC: 1.4 UIU/ML (ref 0.27–4.2)
WBC # BLD AUTO: 6.5 K/UL (ref 4.8–10.8)

## 2023-08-31 ENCOUNTER — TELEPHONE (OUTPATIENT)
Dept: FAMILY MEDICINE CLINIC | Age: 43
End: 2023-08-31

## 2023-08-31 NOTE — TELEPHONE ENCOUNTER
Patient is aware of lab work results via [address] [place] [address]. Also called and left patient a VM.

## 2023-08-31 NOTE — TELEPHONE ENCOUNTER
----- Message from Fatmata Clemente RN sent at 8/31/2023 10:27 AM CDT -----    ----- Message -----  From: Lyndon Huston Incoming Lab Results From [facility] Auditing  Sent: 8/23/2023  11:52 AM CDT  To: Abbie Mcgrath MD

## 2023-12-19 ENCOUNTER — OFFICE VISIT (OUTPATIENT)
Dept: FAMILY MEDICINE CLINIC | Age: 43
End: 2023-12-19
Payer: COMMERCIAL

## 2023-12-19 VITALS
WEIGHT: 127.6 LBS | HEART RATE: 94 BPM | RESPIRATION RATE: 16 BRPM | SYSTOLIC BLOOD PRESSURE: 92 MMHG | OXYGEN SATURATION: 98 % | DIASTOLIC BLOOD PRESSURE: 62 MMHG | BODY MASS INDEX: 25.05 KG/M2 | HEIGHT: 60 IN | TEMPERATURE: 97.2 F

## 2023-12-19 DIAGNOSIS — F90.9 ATTENTION DEFICIT HYPERACTIVITY DISORDER (ADHD), UNSPECIFIED ADHD TYPE: Primary | ICD-10-CM

## 2023-12-19 DIAGNOSIS — F41.9 ANXIETY: ICD-10-CM

## 2023-12-19 DIAGNOSIS — M79.672 LEFT FOOT PAIN: ICD-10-CM

## 2023-12-19 PROCEDURE — 99214 OFFICE O/P EST MOD 30 MIN: CPT | Performed by: FAMILY MEDICINE

## 2023-12-19 RX ORDER — CELECOXIB 100 MG/1
100 CAPSULE ORAL 2 TIMES DAILY
Qty: 180 CAPSULE | Refills: 1 | Status: SHIPPED | OUTPATIENT
Start: 2023-12-19

## 2023-12-19 RX ORDER — CLONAZEPAM 0.5 MG/1
0.5 TABLET ORAL DAILY PRN
Qty: 30 TABLET | Refills: 1 | Status: SHIPPED | OUTPATIENT
Start: 2023-12-19 | End: 2024-12-18

## 2023-12-19 RX ORDER — LISDEXAMFETAMINE DIMESYLATE CAPSULES 30 MG/1
30 CAPSULE ORAL EVERY MORNING
Qty: 30 CAPSULE | Refills: 0 | Status: SHIPPED | OUTPATIENT
Start: 2023-12-19 | End: 2024-12-18

## 2023-12-19 RX ORDER — LISDEXAMFETAMINE DIMESYLATE CAPSULES 30 MG/1
30 CAPSULE ORAL EVERY MORNING
Qty: 30 CAPSULE | Refills: 0 | Status: SHIPPED | OUTPATIENT
Start: 2024-01-17 | End: 2025-01-16

## 2023-12-19 NOTE — PROGRESS NOTES
Holly DARL[facility]  [address]  Dept: [phone]  Dept Fax: [phone]    Visit type: Established patient    Reason for Visit: Medication Refill and Foot Pain (Left)         Assessment and Plan       1. Attention deficit hyperactivity disorder (ADHD), unspecified ADHD type  -     lisdexamfetamine (VYVANSE) 30 MG capsule; Take 1 capsule by mouth every morning. Max Daily Amount: 30 mg, Disp-30 capsule, R-0Normal  -     lisdexamfetamine (VYVANSE) 30 MG capsule; Take 1 capsule by mouth every morning. Max Daily Amount: 30 mg, Disp-30 capsule, R-0Normal  2. Anxiety  -     clonazePAM (KLONOPIN) 0.5 MG tablet; Take 1 tablet by mouth daily as needed for Anxiety. Max Daily Amount: 0.5 mg, Disp-30 tablet, R-1Normal  3. Left foot pain    Discussed possible causes of her foot pain symptoms and potential management options including possibility of involving podiatry in her care. She is looking for conservative management at this time. With patient doing well with her current medications we will continue at this time continue to monitor and adjust as course dictates. Stressed importance of as needed use of her Klonopin only and discussed dietary and lifestyle modifications that may be beneficial.  Discussed signs symptoms requiring medical attention. All questions were answered and patient voiced understanding and agreement with plan as discussed. Return in about 2 months (around 2/19/2024), or if symptoms worsen or fail to improve. Subjective       HPI   Patient reports that she had a burning sensation in her left great toe on the dorsal surface. She has not had any injury or other contributing factors. She has not noticed any skin changes or other associated changes. She states that it occurred previously and went away but then did come back. She states that when it is present nothing seems to make it better or worse.      Patient has a history of ADHD and is doing well with

## 2024-03-19 ENCOUNTER — TELEMEDICINE (OUTPATIENT)
Dept: FAMILY MEDICINE CLINIC | Age: 44
End: 2024-03-19
Payer: COMMERCIAL

## 2024-03-19 DIAGNOSIS — F90.9 ATTENTION DEFICIT HYPERACTIVITY DISORDER (ADHD), UNSPECIFIED ADHD TYPE: Primary | ICD-10-CM

## 2024-03-19 PROCEDURE — 99213 OFFICE O/P EST LOW 20 MIN: CPT | Performed by: FAMILY MEDICINE

## 2024-03-19 RX ORDER — LISDEXAMFETAMINE DIMESYLATE CAPSULES 30 MG/1
30 CAPSULE ORAL EVERY MORNING
Qty: 30 CAPSULE | Refills: 0 | Status: SHIPPED | OUTPATIENT
Start: 2024-03-19 | End: 2025-03-19

## 2024-03-19 RX ORDER — LISDEXAMFETAMINE DIMESYLATE CAPSULES 30 MG/1
30 CAPSULE ORAL EVERY MORNING
Qty: 30 CAPSULE | Refills: 0 | Status: SHIPPED | OUTPATIENT
Start: 2024-04-17 | End: 2025-04-17

## 2024-03-19 NOTE — PROGRESS NOTES
Capri Tejeda, was evaluated through a synchronous (real-time) audio-video encounter. The patient (or guardian if applicable) is aware that this is a billable service, which includes applicable co-pays. This Virtual Visit was conducted with patient's (and/or legal guardian's) consent. Patient identification was verified, and a caregiver was present when appropriate.   The patient was located at Home: [address] [place] [address] [place] [address]  Provider was located at Facility (Appt Dept): [address] [facility] [address] [place] [address]      Capri Tejeda (:  1980) is a Established patient, presenting virtually for evaluation of the following:    Assessment & Plan   Below is the assessment and plan developed based on review of pertinent history, physical exam, labs, studies, and medications.  1. Attention deficit hyperactivity disorder (ADHD), unspecified ADHD type  -     lisdexamfetamine (VYVANSE) 30 MG capsule; Take 1 capsule by mouth every morning. Max Daily Amount: 30 mg, Disp-30 capsule, R-0Normal  -     lisdexamfetamine (VYVANSE) 30 MG capsule; Take 1 capsule by mouth every morning. Max Daily Amount: 30 mg, Disp-30 capsule, R-0Normal    With patient doing well with her current use of Vyvanse we will continue at this time with a prescription into the pharmacy for this month and a prescription for next month and assuming no issues arise we will plan on follow-up in 2 months for continued monitoring and management of her ADHD.  Discussed lifestyle modifications that may be beneficial for her symptoms.  Discussed signs symptoms requiring medical attention.  All questions were answered and patient voiced understanding and agreement with plan as discussed.      Return in about 2 months (around 2024), or if symptoms worsen or fail to improve.       Subjective   HPI  Patient has ADHD and is doing well with her current dose of Vyvanse.  she reports that the medicine allows her to stay focused and complete her tasks.

## 2024-03-30 ASSESSMENT — ENCOUNTER SYMPTOMS
COUGH: 0
DIARRHEA: 0
NAUSEA: 0
VOMITING: 0
RHINORRHEA: 0
CONSTIPATION: 0
SHORTNESS OF BREATH: 0
ABDOMINAL PAIN: 0
BACK PAIN: 1

## 2024-05-07 ENCOUNTER — OFFICE VISIT (OUTPATIENT)
Dept: OBGYN CLINIC | Age: 44
End: 2024-05-07
Payer: COMMERCIAL

## 2024-05-07 VITALS
DIASTOLIC BLOOD PRESSURE: 65 MMHG | WEIGHT: 125.7 LBS | SYSTOLIC BLOOD PRESSURE: 98 MMHG | HEIGHT: 60 IN | BODY MASS INDEX: 24.68 KG/M2 | HEART RATE: 85 BPM

## 2024-05-07 DIAGNOSIS — Z12.4 CERVICAL CANCER SCREENING: ICD-10-CM

## 2024-05-07 DIAGNOSIS — Z12.39 SCREENING BREAST EXAMINATION: ICD-10-CM

## 2024-05-07 DIAGNOSIS — Z01.419 WELL WOMAN EXAM WITH ROUTINE GYNECOLOGICAL EXAM: Primary | ICD-10-CM

## 2024-05-07 DIAGNOSIS — Z11.51 ENCOUNTER FOR SCREENING FOR HUMAN PAPILLOMAVIRUS (HPV): ICD-10-CM

## 2024-05-07 DIAGNOSIS — Z12.31 ENCOUNTER FOR SCREENING MAMMOGRAM FOR MALIGNANT NEOPLASM OF BREAST: ICD-10-CM

## 2024-05-07 PROCEDURE — 99396 PREV VISIT EST AGE 40-64: CPT

## 2024-05-07 NOTE — PROGRESS NOTES
Pt presents today for pap smear and breast exam. Pt denies any complaints or concerns.     Last mammogram:  23  Last pap smear: 2023   Contraception: N/A   :  2  Para:  2  AB:  0  Last bone density: N/A  Last colonoscopy: N/A

## 2024-05-07 NOTE — PROGRESS NOTES
[facility]  CNM Office Note    Capri Tejeda is a 43 y.o. female who presents today for her medical conditions/ complaints as noted below.  Chief Complaint   Patient presents with    Annual Exam     Pt presents for annual pap smear and breast exam      Last mammogram:  23  Last pap smear: 2023   Contraception: N/A   :  2  Para:  2  AB:  0  Last bone density: N/A  Last colonoscopy: N/A    HPI  Capri Tejeda presents today for annual exam. She is due for pap smear and mammogram. She denies history of abnormal pap smears and denies history of abnormal mammograms. She reports family history of breast cancer in maternal grandmother. She reports her periods are regular every 28-30 days. She reports her menses are not problematic. She denies issues with bladder, bowel, or intercourse. Her choice of contraception is: abstinence. She feels her sleep pattern and quality is Good.      Problems/Complaints today:  1. Well woman exam with routine gynecological exam  2. Cervical cancer screening  -     PAP SMEAR  3. Encounter for screening for human papillomavirus (HPV)  -     Human papillomavirus (HPV) DNA probe thin prep high risk  4. Encounter for screening mammogram for malignant neoplasm of breast  -     SABRINA DIGITAL SCREEN W OR WO CAD BILATERAL; Future  5. Screening breast examination       Patient Active Problem List   Diagnosis    Sprain of right ankle    Obstructive sleep apnea    Somnolence, daytime    Witnessed apneic spells    Snoring    Restless leg syndrome    Prediabetes       Patient's last menstrual period was 04/10/2024 (exact date).      Past Medical History:   Diagnosis Date    Anxiety     Attention deficit disorder (ADD) without hyperactivity     IBS (irritable bowel syndrome)     Obstructive sleep apnea     Post partum depression      Past Surgical History:   Procedure Laterality Date    BREAST ENHANCEMENT SURGERY       SECTION  ,2019    CHOLECYSTECTOMY

## 2024-05-31 ENCOUNTER — NURSE ONLY (OUTPATIENT)
Dept: FAMILY MEDICINE CLINIC | Age: 44
End: 2024-05-31

## 2024-05-31 DIAGNOSIS — Z13.9 SCREENING DUE: Primary | ICD-10-CM

## 2024-05-31 ASSESSMENT — PATIENT HEALTH QUESTIONNAIRE - PHQ9
SUM OF ALL RESPONSES TO PHQ QUESTIONS 1-9: 0
SUM OF ALL RESPONSES TO PHQ QUESTIONS 1-9: 0
2. FEELING DOWN, DEPRESSED OR HOPELESS: NOT AT ALL
1. LITTLE INTEREST OR PLEASURE IN DOING THINGS: NOT AT ALL
SUM OF ALL RESPONSES TO PHQ QUESTIONS 1-9: 0
1. LITTLE INTEREST OR PLEASURE IN DOING THINGS: NOT AT ALL
SUM OF ALL RESPONSES TO PHQ QUESTIONS 1-9: 0
SUM OF ALL RESPONSES TO PHQ9 QUESTIONS 1 & 2: 0
SUM OF ALL RESPONSES TO PHQ9 QUESTIONS 1 & 2: 0
1. LITTLE INTEREST OR PLEASURE IN DOING THINGS: NOT AT ALL
SUM OF ALL RESPONSES TO PHQ QUESTIONS 1-9: 0
SUM OF ALL RESPONSES TO PHQ9 QUESTIONS 1 & 2: 0
SUM OF ALL RESPONSES TO PHQ QUESTIONS 1-9: 0
2. FEELING DOWN, DEPRESSED OR HOPELESS: NOT AT ALL
2. FEELING DOWN, DEPRESSED OR HOPELESS: NOT AT ALL

## 2024-05-31 NOTE — PROGRESS NOTES
[facility] Depression Screening Phq-2/9/C-Ssrs (Cascading Screening Questionnaire)    Question 5/31/2024  6:14 AM CDT - Filed by Patient   Over the last 2 weeks, how often have you been bothered by the following problems?    Little interest or pleasure in doing things Not at all   Feeling down, depressed, or hopeless Not at all   Calculation of PHQ 2 values (range: 0 - 6) 0

## 2024-06-03 ENCOUNTER — OFFICE VISIT (OUTPATIENT)
Dept: FAMILY MEDICINE CLINIC | Age: 44
End: 2024-06-03
Payer: COMMERCIAL

## 2024-06-03 VITALS
DIASTOLIC BLOOD PRESSURE: 74 MMHG | HEART RATE: 57 BPM | BODY MASS INDEX: 23.91 KG/M2 | SYSTOLIC BLOOD PRESSURE: 102 MMHG | OXYGEN SATURATION: 100 % | WEIGHT: 121.8 LBS | HEIGHT: 60 IN | TEMPERATURE: 97.4 F

## 2024-06-03 DIAGNOSIS — F41.9 ANXIETY: ICD-10-CM

## 2024-06-03 DIAGNOSIS — F90.9 ATTENTION DEFICIT HYPERACTIVITY DISORDER (ADHD), UNSPECIFIED ADHD TYPE: Primary | ICD-10-CM

## 2024-06-03 PROCEDURE — 99214 OFFICE O/P EST MOD 30 MIN: CPT | Performed by: FAMILY MEDICINE

## 2024-06-03 RX ORDER — CLONAZEPAM 0.5 MG/1
0.5 TABLET ORAL DAILY PRN
Qty: 30 TABLET | Refills: 1 | Status: SHIPPED | OUTPATIENT
Start: 2024-06-03 | End: 2025-06-03

## 2024-06-03 RX ORDER — LISDEXAMFETAMINE DIMESYLATE 30 MG/1
30 CAPSULE ORAL EVERY MORNING
Qty: 30 CAPSULE | Refills: 0 | Status: SHIPPED | OUTPATIENT
Start: 2024-06-03 | End: 2025-06-03

## 2024-06-03 RX ORDER — LISDEXAMFETAMINE DIMESYLATE 30 MG/1
30 CAPSULE ORAL EVERY MORNING
Qty: 30 CAPSULE | Refills: 0 | Status: SHIPPED | OUTPATIENT
Start: 2024-07-01 | End: 2025-07-01

## 2024-06-03 ASSESSMENT — ENCOUNTER SYMPTOMS
CONSTIPATION: 0
BACK PAIN: 1
VOMITING: 0
NAUSEA: 0
ABDOMINAL PAIN: 0
DIARRHEA: 0
RHINORRHEA: 0
SHORTNESS OF BREATH: 0
COUGH: 0

## 2024-06-03 NOTE — PROGRESS NOTES
.   PHONG MCKEON [address] [place] [address]  Dept: [phone]  Dept Fax: [phone]    Visit type: Established patient    Reason for Visit: Medication Refill      Assessment & Plan   Assessment and Plan       1. Attention deficit hyperactivity disorder (ADHD), unspecified ADHD type  -     lisdexamfetamine (VYVANSE) 30 MG capsule; Take 1 capsule by mouth every morning. Max Daily Amount: 30 mg, Disp-30 capsule, R-0Normal  -     lisdexamfetamine (VYVANSE) 30 MG capsule; Take 1 capsule by mouth every morning. Max Daily Amount: 30 mg, Disp-30 capsule, R-0Normal  2. Anxiety  -     clonazePAM (KLONOPIN) 0.5 MG tablet; Take 1 tablet by mouth daily as needed for Anxiety. Max Daily Amount: 0.5 mg, Disp-30 tablet, R-1Normal    With patient appearing to be stable with her current medications for her comorbid medical conditions we will continue at this time continue to monitor and adjust as course dictates.   Stressed importance of as needed use of her Klonopin only and discussed dietary and lifestyle modifications that may be beneficial.  Discussed signs symptoms requiring medical attention.  All questions were answered and patient voiced understanding and agreement with plan as discussed.      Return in about 2 months (around 8/3/2024), or if symptoms worsen or fail to improve.       Subjective       HPI   Patient has ADHD and is doing well with her current dose of Vyvanse.  she reports that the medicine allows her to stay focused and complete her tasks.  Without the medication she has more problems remaining focused and doing the things that she needs to do. she denies any issues with the medication and reports that she continues to have a normal appetite and denies any changes with her sleep with the use of medicine.    She does have issues with anxiety and states that her anxiety is actually been doing quite a bit better since her divorce was finalized.  She still takes Klonopin as needed fashion and

## 2024-06-21 DIAGNOSIS — Z12.31 ENCOUNTER FOR SCREENING MAMMOGRAM FOR MALIGNANT NEOPLASM OF BREAST: ICD-10-CM

## 2024-08-19 ENCOUNTER — OFFICE VISIT (OUTPATIENT)
Dept: FAMILY MEDICINE | Facility: CLINIC | Age: 44
End: 2024-08-19
Payer: COMMERCIAL

## 2024-08-19 VITALS
DIASTOLIC BLOOD PRESSURE: 72 MMHG | TEMPERATURE: 99 F | BODY MASS INDEX: 23.98 KG/M2 | SYSTOLIC BLOOD PRESSURE: 104 MMHG | HEART RATE: 89 BPM | OXYGEN SATURATION: 96 % | WEIGHT: 122.81 LBS

## 2024-08-19 DIAGNOSIS — Z00.00 ANNUAL PHYSICAL EXAM: Primary | ICD-10-CM

## 2024-08-19 DIAGNOSIS — R73.03 PREDIABETES: ICD-10-CM

## 2024-08-19 DIAGNOSIS — F41.9 ANXIETY AND DEPRESSION: ICD-10-CM

## 2024-08-19 DIAGNOSIS — F32.A ANXIETY AND DEPRESSION: ICD-10-CM

## 2024-08-19 DIAGNOSIS — J18.9 PNEUMONIA DUE TO INFECTIOUS ORGANISM, UNSPECIFIED LATERALITY, UNSPECIFIED PART OF LUNG: ICD-10-CM

## 2024-08-19 DIAGNOSIS — F90.9 ATTENTION DEFICIT HYPERACTIVITY DISORDER (ADHD), UNSPECIFIED ADHD TYPE: ICD-10-CM

## 2024-08-19 DIAGNOSIS — R79.89 LOW VITAMIN D LEVEL: ICD-10-CM

## 2024-08-19 PROCEDURE — 1159F MED LIST DOCD IN RCRD: CPT | Mod: CPTII,S$GLB,, | Performed by: FAMILY MEDICINE

## 2024-08-19 PROCEDURE — 3008F BODY MASS INDEX DOCD: CPT | Mod: CPTII,S$GLB,, | Performed by: FAMILY MEDICINE

## 2024-08-19 PROCEDURE — 3074F SYST BP LT 130 MM HG: CPT | Mod: CPTII,S$GLB,, | Performed by: FAMILY MEDICINE

## 2024-08-19 PROCEDURE — 3078F DIAST BP <80 MM HG: CPT | Mod: CPTII,S$GLB,, | Performed by: FAMILY MEDICINE

## 2024-08-19 PROCEDURE — 99386 PREV VISIT NEW AGE 40-64: CPT | Mod: S$GLB,,, | Performed by: FAMILY MEDICINE

## 2024-08-19 PROCEDURE — 99999 PR PBB SHADOW E&M-EST. PATIENT-LVL IV: CPT | Mod: PBBFAC,,, | Performed by: FAMILY MEDICINE

## 2024-08-19 RX ORDER — CLONAZEPAM 0.5 MG/1
TABLET ORAL
COMMUNITY

## 2024-08-19 RX ORDER — TIRZEPATIDE 7.5 MG/.5ML
INJECTION, SOLUTION SUBCUTANEOUS
COMMUNITY

## 2024-08-19 RX ORDER — LISDEXAMFETAMINE DIMESYLATE 30 MG/1
1 CAPSULE ORAL EVERY MORNING
COMMUNITY

## 2024-08-19 NOTE — PROGRESS NOTES
Subjective:       Patient ID: Aretha Chauhan is a 43 y.o. female.    Chief Complaint: Establish Care      HPI Comments:       Current Outpatient Medications:     KLONOPIN 0.5 mg tablet, Take 1 tablet 3 times a day by oral route., Disp: , Rfl:     MOUNJARO 7.5 mg/0.5 mL PnIj, INJECT 7.5 MG SUBCUTANEOUSLY ONCE A WEEK, Disp: , Rfl:     VYVANSE 30 mg capsule, Take 1 capsule by mouth every morning., Disp: , Rfl:       This my 1st time seeing this patient.  She would like to establish care.  She is .  She is accompanied by her young son today.    Recently had pneumonia about 10 days ago.  Z-Kyree.  Still has some chest congestion and cough, and postnasal drip /sore throat in the morning, but otherwise is progressing well.  Has been on Mounjaro from the prescription of a previous PCP in [place].  Gets it here at [facility].  Covered by insurance.  Lost 50 lb in his now tapering.  Every other week.  No weight gain during the three-month of doing biweekly shots.  Would like to get advice on further tapering toward cessation, but is afraid of gaining weight back.  Today we talked about continue slow titration.  She is prediabetic in her A1c has improved during this time on GLP 1    Takes Klonopin very sporadically.  Thirty pills will last 6 months.    No longer needs PPI since she has lost weight.    Wants to make her own gyn appointment to get her Pap and mammogram brought up-to-date    ADHD.  On Vyvanse.  Will need a new provider for this.  Some depression and anxiety in the past.  Will try to get psychiatry consult here, but I told her to look around as well    History of vitamin-D deficiency.  No longer taking supplement.      Review of Systems   Constitutional:  Negative for activity change, appetite change and fever.   HENT:  Positive for postnasal drip and rhinorrhea. Negative for sore throat.    Respiratory:  Positive for cough. Negative for shortness of breath.    Cardiovascular:  Negative for chest pain.    Gastrointestinal:  Negative for abdominal pain, diarrhea and nausea.   Genitourinary:  Negative for difficulty urinating.   Musculoskeletal:  Negative for arthralgias and myalgias.   Neurological:  Negative for dizziness and headaches.   Psychiatric/Behavioral:  Positive for decreased concentration.        Objective:      Vitals:    08/19/24 0853   BP: 104/72   Pulse: 89   Temp: 98.7 °F (37.1 °C)   TempSrc: Tympanic   SpO2: 96%   Weight: 55.7 kg (122 lb 12.7 oz)   PainSc: 0-No pain     Physical Exam  Vitals and nursing note reviewed.   Constitutional:       General: She is not in acute distress.     Appearance: She is well-developed. She is not ill-appearing or diaphoretic.   HENT:      Head: Normocephalic.      Mouth/Throat:      Pharynx: No oropharyngeal exudate.   Neck:      Thyroid: No thyromegaly.   Cardiovascular:      Rate and Rhythm: Normal rate and regular rhythm.      Heart sounds: Normal heart sounds. No murmur heard.  Pulmonary:      Effort: Pulmonary effort is normal.      Breath sounds: Normal breath sounds. No wheezing or rales.   Abdominal:      General: There is no distension.      Palpations: Abdomen is soft.   Musculoskeletal:      Cervical back: Neck supple.   Lymphadenopathy:      Cervical: No cervical adenopathy.   Skin:     General: Skin is warm and dry.   Neurological:      Mental Status: She is alert and oriented to person, place, and time.   Psychiatric:         Mood and Affect: Mood normal.         Behavior: Behavior normal.         Thought Content: Thought content normal.         Judgment: Judgment normal.         Assessment:       1. Annual physical exam    2. Pneumonia due to infectious organism, unspecified laterality, unspecified part of lung    3. Prediabetes    4. Low vitamin D level    5. Attention deficit hyperactivity disorder (ADHD), unspecified ADHD type    6. Anxiety and depression        Plan:   Annual physical exam  Comments:  Pap and mammogram will get through gyn  consultation.  Fasting lipid profile  Orders:  -     CBC Auto Differential; Future; Expected date: 08/19/2024  -     Comprehensive Metabolic Panel; Future; Expected date: 08/19/2024  -     Hemoglobin A1C; Future; Expected date: 08/19/2024  -     Hepatitis C Antibody; Future; Expected date: 08/19/2024  -     HIV 1/2 Ag/Ab (4th Gen); Future; Expected date: 08/19/2024  -     Lipid Panel; Future; Expected date: 08/19/2024  -     TSH; Future; Expected date: 08/19/2024    Pneumonia due to infectious organism, unspecified laterality, unspecified part of lung  Comments:  Ten days out.  Seems to be progressing well.  Follow-up parameters given    Prediabetes  Comments:  50 lb weight loss on GLP 1 therapy.  A1c today    Low vitamin D level  Comments:  Off supplementation.  Check level today  Orders:  -     Vitamin D; Future; Expected date: 08/19/2024    Attention deficit hyperactivity disorder (ADHD), unspecified ADHD type  Comments:  Psychiatry consult  Orders:  -     Ambulatory referral/consult to Psychiatry; Future; Expected date: 08/26/2024    Anxiety and depression  Comments:  Doing better since divorce.  Psychiatry consult  Orders:  -     Ambulatory referral/consult to Psychiatry; Future; Expected date: 08/26/2024

## 2024-08-26 ENCOUNTER — LAB VISIT (OUTPATIENT)
Dept: LAB | Facility: HOSPITAL | Age: 44
End: 2024-08-26
Attending: FAMILY MEDICINE
Payer: COMMERCIAL

## 2024-08-26 DIAGNOSIS — R79.89 LOW VITAMIN D LEVEL: ICD-10-CM

## 2024-08-26 DIAGNOSIS — Z00.00 ANNUAL PHYSICAL EXAM: ICD-10-CM

## 2024-08-26 LAB
25(OH)D3+25(OH)D2 SERPL-MCNC: 18 NG/ML (ref 30–96)
ALBUMIN SERPL BCP-MCNC: 3.9 G/DL (ref 3.5–5.2)
ALP SERPL-CCNC: 60 U/L (ref 55–135)
ALT SERPL W/O P-5'-P-CCNC: 7 U/L (ref 10–44)
ANION GAP SERPL CALC-SCNC: 6 MMOL/L (ref 8–16)
AST SERPL-CCNC: 13 U/L (ref 10–40)
BASOPHILS # BLD AUTO: 0.05 K/UL (ref 0–0.2)
BASOPHILS NFR BLD: 0.8 % (ref 0–1.9)
BILIRUB SERPL-MCNC: 0.9 MG/DL (ref 0.1–1)
BUN SERPL-MCNC: 7 MG/DL (ref 6–20)
CALCIUM SERPL-MCNC: 8.9 MG/DL (ref 8.7–10.5)
CHLORIDE SERPL-SCNC: 109 MMOL/L (ref 95–110)
CHOLEST SERPL-MCNC: 133 MG/DL (ref 120–199)
CHOLEST/HDLC SERPL: 2.6 {RATIO} (ref 2–5)
CO2 SERPL-SCNC: 23 MMOL/L (ref 23–29)
CREAT SERPL-MCNC: 0.8 MG/DL (ref 0.5–1.4)
DIFFERENTIAL METHOD BLD: ABNORMAL
EOSINOPHIL # BLD AUTO: 0.1 K/UL (ref 0–0.5)
EOSINOPHIL NFR BLD: 0.8 % (ref 0–8)
ERYTHROCYTE [DISTWIDTH] IN BLOOD BY AUTOMATED COUNT: 13.2 % (ref 11.5–14.5)
EST. GFR  (NO RACE VARIABLE): >60 ML/MIN/1.73 M^2
ESTIMATED AVG GLUCOSE: 100 MG/DL (ref 68–131)
GLUCOSE SERPL-MCNC: 87 MG/DL (ref 70–110)
HBA1C MFR BLD: 5.1 % (ref 4–5.6)
HCT VFR BLD AUTO: 38.2 % (ref 37–48.5)
HCV AB SERPL QL IA: NORMAL
HDLC SERPL-MCNC: 51 MG/DL (ref 40–75)
HDLC SERPL: 38.3 % (ref 20–50)
HGB BLD-MCNC: 12.5 G/DL (ref 12–16)
HIV 1+2 AB+HIV1 P24 AG SERPL QL IA: NORMAL
IMM GRANULOCYTES # BLD AUTO: 0.01 K/UL (ref 0–0.04)
IMM GRANULOCYTES NFR BLD AUTO: 0.2 % (ref 0–0.5)
LDLC SERPL CALC-MCNC: 72.6 MG/DL (ref 63–159)
LYMPHOCYTES # BLD AUTO: 1.5 K/UL (ref 1–4.8)
LYMPHOCYTES NFR BLD: 24.3 % (ref 18–48)
MCH RBC QN AUTO: 26.7 PG (ref 27–31)
MCHC RBC AUTO-ENTMCNC: 32.7 G/DL (ref 32–36)
MCV RBC AUTO: 82 FL (ref 82–98)
MONOCYTES # BLD AUTO: 0.5 K/UL (ref 0.3–1)
MONOCYTES NFR BLD: 7.6 % (ref 4–15)
NEUTROPHILS # BLD AUTO: 4 K/UL (ref 1.8–7.7)
NEUTROPHILS NFR BLD: 66.3 % (ref 38–73)
NONHDLC SERPL-MCNC: 82 MG/DL
NRBC BLD-RTO: 0 /100 WBC
PLATELET # BLD AUTO: 335 K/UL (ref 150–450)
PMV BLD AUTO: 10.5 FL (ref 9.2–12.9)
POTASSIUM SERPL-SCNC: 4.7 MMOL/L (ref 3.5–5.1)
PROT SERPL-MCNC: 6.5 G/DL (ref 6–8.4)
RBC # BLD AUTO: 4.68 M/UL (ref 4–5.4)
SODIUM SERPL-SCNC: 138 MMOL/L (ref 136–145)
TRIGL SERPL-MCNC: 47 MG/DL (ref 30–150)
TSH SERPL DL<=0.005 MIU/L-ACNC: 0.96 UIU/ML (ref 0.4–4)
WBC # BLD AUTO: 6.06 K/UL (ref 3.9–12.7)

## 2024-08-26 PROCEDURE — 80061 LIPID PANEL: CPT | Performed by: FAMILY MEDICINE

## 2024-08-26 PROCEDURE — 86803 HEPATITIS C AB TEST: CPT | Performed by: FAMILY MEDICINE

## 2024-08-26 PROCEDURE — 80053 COMPREHEN METABOLIC PANEL: CPT | Performed by: FAMILY MEDICINE

## 2024-08-26 PROCEDURE — 85025 COMPLETE CBC W/AUTO DIFF WBC: CPT | Performed by: FAMILY MEDICINE

## 2024-08-26 PROCEDURE — 84443 ASSAY THYROID STIM HORMONE: CPT | Performed by: FAMILY MEDICINE

## 2024-08-26 PROCEDURE — 83036 HEMOGLOBIN GLYCOSYLATED A1C: CPT | Performed by: FAMILY MEDICINE

## 2024-08-26 PROCEDURE — 82306 VITAMIN D 25 HYDROXY: CPT | Performed by: FAMILY MEDICINE

## 2024-08-26 PROCEDURE — 36415 COLL VENOUS BLD VENIPUNCTURE: CPT | Mod: PO | Performed by: FAMILY MEDICINE

## 2024-08-26 PROCEDURE — 87389 HIV-1 AG W/HIV-1&-2 AB AG IA: CPT | Performed by: FAMILY MEDICINE
